# Patient Record
Sex: FEMALE | Race: WHITE | Employment: OTHER | URBAN - METROPOLITAN AREA
[De-identification: names, ages, dates, MRNs, and addresses within clinical notes are randomized per-mention and may not be internally consistent; named-entity substitution may affect disease eponyms.]

---

## 2017-12-15 ENCOUNTER — HOSPITAL ENCOUNTER (OUTPATIENT)
Dept: GENERAL RADIOLOGY | Facility: MEDICAL CENTER | Age: 72
Discharge: HOME | End: 2017-12-15
Attending: ORTHOPAEDIC SURGERY | Admitting: ORTHOPAEDIC SURGERY

## 2017-12-15 ENCOUNTER — OFFICE VISIT (OUTPATIENT)
Dept: ORTHOPEDIC SURGERY | Age: 72
End: 2017-12-15

## 2017-12-15 VITALS
HEART RATE: 82 BPM | DIASTOLIC BLOOD PRESSURE: 82 MMHG | WEIGHT: 200 LBS | HEIGHT: 63 IN | BODY MASS INDEX: 35.44 KG/M2 | SYSTOLIC BLOOD PRESSURE: 138 MMHG

## 2017-12-15 DIAGNOSIS — M25.461 EFFUSION OF RIGHT KNEE: ICD-10-CM

## 2017-12-15 DIAGNOSIS — S83.411A SPRAIN OF MEDIAL COLLATERAL LIGAMENT OF RIGHT KNEE, INITIAL ENCOUNTER: ICD-10-CM

## 2017-12-15 DIAGNOSIS — G89.29 CHRONIC PAIN OF RIGHT KNEE: ICD-10-CM

## 2017-12-15 DIAGNOSIS — M25.561 CHRONIC PAIN OF RIGHT KNEE: Primary | ICD-10-CM

## 2017-12-15 DIAGNOSIS — G89.29 CHRONIC PAIN OF RIGHT KNEE: Primary | ICD-10-CM

## 2017-12-15 DIAGNOSIS — M23.91 INTERNAL DERANGEMENT OF RIGHT KNEE: ICD-10-CM

## 2017-12-15 DIAGNOSIS — M21.061 ACQUIRED GENU VALGUM OF RIGHT KNEE: ICD-10-CM

## 2017-12-15 DIAGNOSIS — M25.561 CHRONIC PAIN OF RIGHT KNEE: ICD-10-CM

## 2017-12-15 DIAGNOSIS — M17.11 PRIMARY OSTEOARTHRITIS OF RIGHT KNEE: ICD-10-CM

## 2017-12-15 PROCEDURE — 73562 X-RAY EXAM OF KNEE 3: CPT | Performed by: PHYSICIAN ASSISTANT

## 2017-12-15 PROCEDURE — 99214 OFFICE O/P EST MOD 30 MIN: CPT | Performed by: PHYSICIAN ASSISTANT

## 2017-12-15 RX ORDER — ESOMEPRAZOLE MAGNESIUM 40 MG/1
40 CAPSULE, DELAYED RELEASE ORAL PRN
COMMUNITY
Start: 2017-04-14

## 2017-12-15 NOTE — LETTER
FAXED/SENT TO Dr Everton Flaherty  12/15/17, 4:16 PM        Leonora Younger PA-C  Orthopaedic Surgery & Sports Medicine      Dear Dr Everton Flaherty,    Thank you very much for your referral of Ms. Lencho Hassan to me for evaluation and treatment. Attached below is my report and recommendations from Jeny More most recent office visit. I appreciate your confidence in me and thank you for allowing me the opportunity to care for your patients. If I can be of any further assistance to you on this or any other patient, please do not hesitate to contact me. Sincerely,    Leonora Younger PA-C  Electronically signed by Leonora Younger PA-C on 12/15/2017 at 4:16 PM     Contact Information:  Otto Cooks,  &  Clinical Staff  [phone], Option [phone] [place]  Orthopaedic Surgery & Sports Medicine       [address] OFFICE    [facility] OFFICE  [address] [place] [address], [address] [place] [address]    [phone] Option 3   [phone] Option [phone] (fax)    [phone] (fax)       PATIENT: Lencho Hassan    81 y.o.  female  [facility]: 1945   MRN:  [id number]       Date of current encounter: 12/15/2017  This encounter is evaluated as a:        New Patient Visit     Established Patient Visit, has seen Dr. Fidencio Toledo       Patient's PCP is Dr. Retana Rather:     Chief Complaint   Patient presents with    Knee Pain     evaluation of right knee        HPI:  Lencho Hassan is a 81y.o. year old,  female complaining of right knee pain. On October 30th she was walking and carrying some sheets in her condo in [place] when she slipped on one of the sheets and fell directly onto her right knee. Her knee did swell and bruise. She rested, elevated and iced her right knee which did help some.  She continues to have pain in 1). The various treatment options were outlined and discussed with Louise Banda including:       Conservative care options:      physical therapy, ice, NSAID's, bracing, and activity modification        The indications for therapeutic injections: Steroids and Hyluronic Acid/Synvisc/Eufflexxa/Supartz       The indications for additional imaging/laboratory studies       The indications for (possible future) operative intervention: Right total knee replacement in her lifetime    2). After considering the various options discussed, Louise Banda elected to pursue a course of treatment that includes the following:       MEDICATIONS/REFILLS prescribed today are listed below. No refills today. She may continue to take Tylenol. She cannot take anti-inflammatories due to a history of ulcers and hiatal hernia. She can use Asper cream with lidocaine over-the-counter ointment for her knee and Lidocare pain patches which are also available over-the-counter. Physical Therapy/HEP Formal physical therapy for her right knee      Script: 2 x per week x 4 weeks     Ice to affected area: 15-20 minutes 4 x day     Over the Counter/Suppliment Tx     Taking Glucosamine and Chondroitin Sulfate (1500 mg/d)     Vit D 3 (at least 2,000 IU/day)     DME's: Continue to wear compression knee brace. I did recommend that she use her walker or cane for stability. Radiology/Misc Testing:      MRIs: right knee     Return to Clinic/Follow - Up:  Louise Banda was asked to make a follow-up appointment:     1-2 weeks following MRI, to review results. Louise Banda was instructed to call the office if her symptoms worsen or if new symptoms appear prior to the next scheduled visit. She is specifically instructed to contact the office between now & her scheduled appointment if she has concerns related to her condition or if she needs assistance in scheduling the above tests.  She is welcome to call for an appointment sooner if she has any additional concerns or questions. Patient Education Materials Provided:   Ryder Jay PA-C: Dr. Juan C Berg patient folder, Knee Booklet, Anatomic Drawings and treatment algorithms    Patient Instructions      I have asked Louisa Mckeon to schedule a follow-up appointment with me after her testing to review the results of these studies in approximately 1 - 2 weeks. She is specifically instructed to contact the office between now & her scheduled appointment if she has concerns related to her condition. She is welcome to call for an appointment sooner if she has any additional concerns or questions. Ice (\"ICE IS YOUR FRIEND\": try using a bag of frozen peas or corn) to injured/painful area for 15  20 minutes 3 x day. General Medication Instructions:  Any prescriptions must be used as directed. If you have any concerns, questions or require refills, please contact our office. If you experience any adverse reactions, stop the medication and call our office immediately. If you designate a preferred pharmacy, appropriate prescriptions will be sent to your preferred pharmacy for pickup to be use as directed. Patient Driving Instructions:  No driving if you are taking narcotic pain medications or muscle relaxers. PATIENT REMINDER:   Carry a list of your medications and allergies with you at all times. Call your pharmacy and our office at least 1 week in advance to refill prescriptions. Narcotic medications will not be refilled after hours or on weekends. ATTENTION    As of October 2, 2014, the [facility] ([address]) has mandated that ALL PRESCRIPTION PAIN MEDICINE cannot be called into your pharmacy.     This includes:    Oxycodone (Percocet)  Hydrocodone (Vicodin, Norco)  Tramadol (Ultram)  Other    These medications must have prescriptions which are written and signed by If you or someone you know struggles with weight issues and joint pain, please check out this important documentary:      \"Start Moving Start Living\" =  [email or web address]       (YOUTUBE video SMSL FULL SD)           FALL PREVENTION:  SIMPLE TIPS    Vitamin D3:  Over-the-counter supplement of Vitamin D3, (at least 2,000 IU daily). Vitamin D3 is widely available without a prescription at pharmacies and buying clubs ([facility]) and on-line at sites such as Amazon.com. It comes in a variety of formulations (tablets, gelcaps, liquid) and doses (1,000 IU, 2,000 IU, 4,000 IU, 5,000 IU and even higher). The right dose for most people is 2,000 IU per day but higher doses are sometimes needed. You can take your vitamin D3 at any time of the day, with or without food, with or without calcium. Because vitamin D is long acting, if you miss your vitamin D on one day you can double up the dose on a later day. Exercise: Low impact exercise programs such as Apolinar Chi. Chair Raise Exercise. Yoga. Adult fitness classes at the  or community [place]. Physical Therapy: Formal physical therapy program to strengthen your lower extremities, improve your balance and gait. Home Assessment: Remove clutter and tripping hazards: loose/throw rugs, cords or cables. Install or placement of railings, grab bars around steps/stairs and in the bathroom (toilet, bath tub, sink). Improve lighting. Foot Wear:  Stable, well fitting. Avoid loose straps or ties, slippery soles. Vision/Eye Check Up: Have your vision checked yearly. Resources:  www.cdc.gov/injury/STEADI    1). STEADI: Stay Independent Self Risk Assessment    2). CDC Handouts: How to prevent falls, Home Safety Fall Prevention         If you or someone you know struggles with weight issues and joint pain, please check out this important documentary:      \"Start Moving Start Living\" =  Http://[employer]

## 2017-12-15 NOTE — PATIENT INSTRUCTIONS
& Clinical Staff  [phone], Option 3         IMPORTANT NARCOTIC INFORMATION: PLEASE READ     [x] DO NOT share your prescription medication with anyone! Sharing is illegal.  The prescription dose is based on your age, weight, and health problems. Sharing your narcotic prescription can lead to accidental death of the individual for which the prescription was not prescribed. You may not know about his/her addiction problem. [x] Always use the same pharmacy when filling your narcotic prescriptions. [x] DO NOT mix your narcotic prescription with alcohol. Mixing the narcotic prescription medication with alcohol causes depressive effects including breathing problems, organ malfunction, and cardiac arrest.     [x] Always keep your narcotic medication in a locked, secured location. Keep your medication private. This is to avoid individuals from taking your medication without your knowledge. This medication is highly sought after and locking your prescriptions will protect you from being a target of crime. [x] DO NOT stock pile your medication. This also will protect you from being a target of crime. [x] Dispose of any unused medications properly. Do not flush the medications. Look for appropriate waste collection programs in your community and drug take back events. [x] DO NOT drive while taking narcotic pain medication or muscle relaxers. FOR MORE INFORMATION, CHECK OUT THIS RESOURCE    For your convenience, Dr. Peggy Fernandes has provided the following link that may be helpful for you if you would like more information on your Orthopaedic condition:    www. Orthoinfo. org         If you or someone you know struggles with weight issues and joint pain, please check out this important documentary:      \"Start Moving Start Living\" =  [email or web address]       (YOUTUBE video SMSL FULL SD)           FALL PREVENTION:  SIMPLE TIPS    Vitamin D3:  Over-the-counter

## 2017-12-15 NOTE — PROGRESS NOTES
children: N/A    Years of education: N/A     Occupational History    Not on file. Social History Main Topics    Smoking status: Never Smoker    Smokeless tobacco: Never Used    Alcohol use Not on file    Drug use: Unknown    Sexual activity: Not on file     Other Topics Concern    Not on file     Social History Narrative    No narrative on file     No family history on file. Review of Systems (ROS):    [x]Performed. Hershal Person review of systems has been performed by intake and observation. An updated ROS was scanned into the media tab of the chart today 12/15/2017 . She has been instructed to contact her primary care physician regarding ROS issues if not already being addressed at this time. There are no recent changes. Objective:   Physical Exam  Vital Signs:  /82   Pulse 82   Ht 5' 3\" (1.6 m)   Wt 200 lb (90.7 kg)   BMI 35.43 kg/m²     Constitution:  Generally, Eveline Byrnes is [x] alert, [x] appears stated age, and [x] in no distress. Her general body habitus is [] Cachectic  [] Thin  [] Normal  [x] Obese  [] Morbidly Obese    Head: [x] Normocephalic  Eyes: [x] Extra-occular muscles intact   Left Ear: [x] External Ear normal   Right Ear: [x] External Ear normal   Nose: [x] Normal  Mouth: [x] Oral mucosa moist  [x] No perioral lesions    Pulmonary: [x] Respirations unlabored and regular  Skin: [x] Warm [x] Well perfused     Psychiatric:   [x] Good judgement and insight  [x] Oriented to [x] person, [x] place, and [x] time  [x] Mood appropriate for circumstances    Gait:   Gait is [] Normal  [x] Impaired limping  Assistive Device: [x] None  [] Knee Brace  [] El Spare  [] Crutches   [] Barbette Augustine   [] Wheelchair  [] Other    ORTHOPAEDIC KNEE EXAM: RIGHT   Inspection:  [x] Skin intact without abrasion or lacerations.   [x] Ecchymosis:  [x] none  [] mild  [] moderate  [] severe  [x] Atrophy:  [x] none  [] mild  [] moderate  [] severe  [x] Effusion: [] none  [x] mild  [] moderate  [] severe  [] Scar / Surgical incision(s): [] A-Scope Portals  [] Open Surgical Incision(s)    Alignment:  [] Normal  [] Varus [x] Valgus    Range of Motion:  [] Normal Knee ROM         [] Deferred: acute injury/post-surgery/pain   [x] Limited ROM: 0-90° with pain at end ranges    Palpation:   [] No Tenderness  [x] Tenderness: Medial joint line, MCL insertion, tibial tubercle [] mild  [x] moderate  [] severe   [x] Patellofemoral Crepitation:  [] none  [] mild  [x] moderate  [] severe  [x] Baker's Cyst:  [x] none  [] small  [] medium  [] large  [x] Robert's Sign: [x] negative  [] positive    Provocative Tests:   [x] Negative: Lachman, posterior drawer, varus stress test, patellar apprehension  Positive Tests:   Meniscus Testing:   [x] Medial Timo's Test (MMT)   [x] Lateral Timo's Test (LMT)        Instability Testing:   [] Lachman (ACL)   [] Posterior Drawer (PCL)   [x] Valgus Stress in Extension (MCL) Positive pain    [x] Valgus Stress in 30º of Flexion (MCL) Positive for pain    [] Varus Stress Test (LCL)       [] Patella Apprehension   [] General Ligament Laxity     Provocative Tests: BILATERAL HIP(S)  [x] Negative logroll  Positive Tests:  [] Log Roll   [x] Hamstrings Tightness     Motor Function:   [x] No gross motor weakness  [] Motor weakness:  [] mild  [] moderate  [] severe     Neurologic:  [x] Sensation to light touch intact   [x] Coordination / proprioception intact    Circulation:  [x] The limb is warm and well perfused  [x] Capillary refill is intact. [x] Edema  [x] none  [] mild  [] moderate  [] severe  [x] Venous stasis changes  [x] none  [] mild  [] moderate  [] severe    Contralateral Knee:  [x] No pain with ROM. Well-healed anterior midline surgical incision consistent with left total knee replacement.     Data Reviewed:     XRays:  (3 views: AP, lateral and patella views) of her right knee taken today 12/15/17 in the office and reviewed by me personally showed moderate degenerative changes in the patellofemoral compartment, moderate to severe degenerative changes in the medial compartment and mild degenerative changes in the lateral compartment. There is joint space narrowing and osteophyte formation in the medial compartment. There is neutral alignment. Of note on AP view there is a satisfactory left TKR with no evidence of acute fracture or loosening. [x]  A copy of the AP XRay was given to her and reviewed with her today. Assessment (Medical Decision Making):     Sarah Oden is a 81y.o. year old female with the following diagnosis and treatments ordered today:    1. Chronic pain of right knee  XR KNEE RIGHT (3 VIEWS)    MRI Knee Right WO Contrast    Ambulatory referral to Physical Therapy   2. Primary osteoarthritis of right knee  MRI Knee Right WO Contrast    Ambulatory referral to Physical Therapy   3. Acquired genu valgum of right knee  MRI Knee Right WO Contrast    Ambulatory referral to Physical Therapy   4. Effusion of right knee  MRI Knee Right WO Contrast    Ambulatory referral to Physical Therapy   5. Sprain of medial collateral ligament of right knee, initial encounter     6. Internal derangement of right knee  MRI Knee Right WO Contrast    Ambulatory referral to Physical Therapy       Her overall course: Worsening    Plan (Medical Decision Making):      I discussed the diagnosis and the treatment options with Sarah Oden today. In Summary:  1). The various treatment options were outlined and discussed with Saarh Oden including:      [x] Conservative care options:      physical therapy, ice, NSAID's, bracing, and activity modification       [x] The indications for therapeutic injections: Steroids and Hyluronic Acid/Synvisc/Eufflexxa/Supartz      [x] The indications for additional imaging/laboratory studies      [x] The indications for (possible future) operative intervention: Right total knee replacement in her lifetime    2).  After considering the various options Requested:   1       Refills/New Prescriptions:  No orders of the defined types were placed in this encounter. Today's prescription medications will be e-scribed (when appropriate) to the Patient's Preferred Pharmacy:   [facility] [address], [address] [phone] [facility] [phone]  [facility] [address]  Phone: [phone] Fax: [phone]       Shahnaz Wilcox PA-C  Electronically signed by Shahnaz Wilcox PA-C on 12/15/2017 at 4:11 PM     Contact Information:  Tyler Boyer, [address] Staff  [phone], Option 3    This dictation was performed with a verbal recognition program (DRAGON) and it was checked for errors. It is possible that there are still dictated errors within this office note. If so, please bring any errors to my attention for an addendum. All efforts were made to ensure that this office note is accurate. I have personally performed and/or participated in the history, physical exam and medical decision making and reviewed all pertinent clinical information unless otherwise noted.

## 2017-12-22 ENCOUNTER — OFFICE VISIT (OUTPATIENT)
Dept: ORTHOPEDIC SURGERY | Age: 72
End: 2017-12-22

## 2017-12-22 VITALS
BODY MASS INDEX: 35.44 KG/M2 | SYSTOLIC BLOOD PRESSURE: 130 MMHG | WEIGHT: 200 LBS | HEIGHT: 63 IN | HEART RATE: 75 BPM | DIASTOLIC BLOOD PRESSURE: 85 MMHG

## 2017-12-22 DIAGNOSIS — M25.461 EFFUSION OF RIGHT KNEE: ICD-10-CM

## 2017-12-22 DIAGNOSIS — D16.21 ENCHONDROMA OF RIGHT TIBIA: ICD-10-CM

## 2017-12-22 DIAGNOSIS — G89.29 CHRONIC PAIN OF RIGHT KNEE: Primary | ICD-10-CM

## 2017-12-22 DIAGNOSIS — S83.411A SPRAIN OF MEDIAL COLLATERAL LIGAMENT OF RIGHT KNEE, INITIAL ENCOUNTER: ICD-10-CM

## 2017-12-22 DIAGNOSIS — M21.061 ACQUIRED GENU VALGUM OF RIGHT KNEE: ICD-10-CM

## 2017-12-22 DIAGNOSIS — M17.11 PRIMARY OSTEOARTHRITIS OF RIGHT KNEE: ICD-10-CM

## 2017-12-22 DIAGNOSIS — M25.561 CHRONIC PAIN OF RIGHT KNEE: Primary | ICD-10-CM

## 2017-12-22 DIAGNOSIS — S83.231D COMPLEX TEAR OF MEDIAL MENISCUS OF RIGHT KNEE AS CURRENT INJURY, SUBSEQUENT ENCOUNTER: ICD-10-CM

## 2017-12-22 DIAGNOSIS — M71.21 BAKER'S CYST OF KNEE, RIGHT: ICD-10-CM

## 2017-12-22 PROBLEM — M23.91 INTERNAL DERANGEMENT OF RIGHT KNEE: Status: ACTIVE | Noted: 2017-12-22

## 2017-12-22 PROCEDURE — 20610 DRAIN/INJ JOINT/BURSA W/O US: CPT | Performed by: PHYSICIAN ASSISTANT

## 2017-12-22 PROCEDURE — 99999 PR OFFICE/OUTPT VISIT,PROCEDURE ONLY: CPT | Performed by: PHYSICIAN ASSISTANT

## 2017-12-22 NOTE — PROGRESS NOTES
Yes  Work-Related Injury: No  Are there other pain locations you wish to document?: No    Patient Active Problem List   Diagnosis    Knee effusion    Primary osteoarthritis of right knee    Acquired valgus deformity knee    Status post total left knee replacement    Unstable right knee    Chronic pain of right knee    Sprain of medial collateral ligament of right knee    Internal derangement of right knee     Past Medical History:   Diagnosis Date    Arthritis     Asthma     Fibromyalgia     Hyperlipidemia     Obesity     Vertigo      Past Surgical History:   Procedure Laterality Date    BREAST BIOPSY      HYSTERECTOMY      JOINT REPLACEMENT Left     by Dr. Gretchen Best       Allergies:  Sulfa antibiotics    Medications:  Prior to Visit Medications    Medication Sig Taking? Authorizing Provider   esomeprazole (NEXIUM) 40 MG delayed release capsule Take 40 mg by mouth  Historical Provider, MD   meclizine (ANTIVERT) 12.5 MG tablet Take 12.5 mg by mouth 3 times daily as needed. Historical Provider, MD   sertraline (ZOLOFT) 50 MG tablet Take 50 mg by mouth daily. Historical Provider, MD   loratadine (CLARITIN) 10 MG tablet Take 10 mg by mouth daily. Historical Provider, MD   fluticasone (FLOVENT HFA) 110 MCG/ACT inhaler Inhale 1 puff into the lungs 2 times daily. Historical Provider, MD     Social History     Social History    Marital status:      Spouse name: N/A    Number of children: N/A    Years of education: N/A     Occupational History    Not on file. Social History Main Topics    Smoking status: Never Smoker    Smokeless tobacco: Never Used    Alcohol use 0.0 oz/week    Drug use: No    Sexual activity: Not on file     Other Topics Concern    Not on file     Social History Narrative    No narrative on file     History reviewed. No pertinent family history. Review of Systems (ROS):    [x]Performed.   Joan Mccabe review of systems has been performed by intake and weakness:  [] mild  [] moderate  [] severe      Neurologic:  [x] Sensation to light touch intact   [x] Coordination / proprioception intact    Circulation:  [x] The limb is warm and well perfused  [x] Capillary refill is intact. [x] Edema  [x] none  [] mild  [] moderate  [] severe  [x] Venous stasis changes  [x] none  [] mild  [] moderate  [] severe    Contralateral Knee:  [x] No pain with ROM. Well-healed anterior midline surgical incision consistent with left total knee replacement. Bilateral Hip Exam:  [x] Negative logroll    Data Reviewed:     No imaging studies were obtained today. XRays:  (3 views: AP, lateral and patella views) of her right knee taken 12/15/17  showed moderate degenerative changes in the patellofemoral compartment, moderate to severe degenerative changes in the medial compartment and mild degenerative changes in the lateral compartment. There is joint space narrowing and osteophyte formation in the medial compartment. There is neutral alignment. Of note on AP view there is a satisfactory left TKR with no evidence of acute fracture or loosening. MRI Results: right knee dated 12/20/17 shows:  CONCLUSION:    1. 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 This is    similar to prior study although may have slightly progressed. Regions of intermediate-grade to    high-grade diffuse chondromalacia are noted throughout the medial, patellofemoral and less so    lateral compartment. 2. Diffuse medial capsulitis or capsulosynovitis. This progressed relative to the prior study. 3. No fracture, AVN or mass. 4. Please see above. Assessment (Medical Decision Making):     Kevin Bunch is a 81y.o. year old female with the following diagnosis:    1.  Chronic pain of right knee  DE ARTHROCENTESIS ASPIR&/INJ MAJOR JT/BURSA W/O US    DE METHYLPREDNISOLONE shoes are suggested    [x] Injection into her right knee today. Return to Clinic/Follow - Up:  Coty Shahid was asked to make a follow-up appointment:    [x] With Dr. Jr Camp when she returns from [place] in 4 months. Coty Shahid was instructed to call the office if her symptoms worsen or if new symptoms appear prior to the next scheduled visit. She is specifically instructed to contact the office between now & her scheduled appointment if she has concerns related to her condition or if she needs assistance in scheduling the above tests. She is welcome to call for an appointment sooner if she has any additional concerns or questions. PROCEDURE NOTE: RIGHT KNEE INJECTION  12/22/2017 at 12:59 PM   Procedure: Injection  Verbal consent was obtained. Risks and benefits were explained. Questions were encouraged and answered. Timeout Verification Completed including:    Correct patient: Coty Shahid was identified    Correct procedure    Correct site & side    Correct equipment and supplies    Staff member: Karla Bruner PA-C      Assistant: John Koehler     Injection Site: Superolateral    The injection site was prepped with Chlora-Prep using aseptic technique and a right knee intra-articular injection was performed with ethyl chloride for anesthetic. Depomedrol 2 ml (40 mg/ml = 80 mg total) was injected. A sterile adhesive dressing was applied. Post procedure:  Coty Shahid tolerated the treatment well. Instructions to patient:  Appropriate post injections instructions were given to Coty Shahid. Patient Education Materials Provided:  [x] A copy of the test results (MRI of her right knee) which we reviewed together today. Anatomic drawings and treatment algorithms     Patient Instructions      I have asked Coty Shahid to schedule an appointment with Dr. Jr Camp.     Ice (\"ICE IS YOUR FRIEND\": try using a bag of frozen peas or corn) to injured/painful area for 15  20 minutes 3 x day. General Medication Instructions:  Any prescriptions must be used as directed. If you have any concerns, questions or require refills, please contact our office. If you experience any adverse reactions, stop the medication and call our office immediately. If you designate a preferred pharmacy, appropriate prescriptions will be sent to your preferred pharmacy for pickup to be use as directed. Patient Driving Instructions:  No driving if you are taking narcotic pain medications or muscle relaxers. PATIENT REMINDER:   Carry a list of your medications and allergies with you at all times. Call your pharmacy and our office at least 1 week in advance to refill prescriptions. Narcotic medications will not be refilled after hours or on weekends. FALL PREVENTION:  SIMPLE TIPS    Vitamin D3:  Over-the-counter supplement of Vitamin D3, (at least 2,000 IU daily). Vitamin D3 is widely available without a prescription at pharmacies and buying clubs ([facility]) and on-line at sites such as Amazon.com. It comes in a variety of formulations (tablets, gelcaps, liquid) and doses (1,000 IU, 2,000 IU, 4,000 IU, 5,000 IU and even higher). The right dose for most people is 2,000 IU per day but higher doses are sometimes needed. You can take your vitamin D3 at any time of the day, with or without food, with or without calcium. Because vitamin D is long acting, if you miss your vitamin D on one day you can double up the dose on a later day. Exercise: Low impact exercise programs such as Apolinar Chi. Chair Raise Exercise. Yoga. Adult fitness classes at the  or community center. Physical Therapy: Formal physical therapy program to strengthen your lower extremities, improve your balance and gait. Home Assessment: Remove clutter and tripping hazards: loose/throw rugs, cords or cables. Install or placement of railings, grab bars around steps/stairs and in the bathroom (toilet, bath tub, sink).  Improve

## 2017-12-22 NOTE — PATIENT INSTRUCTIONS
I have asked Dinesh Albert to schedule an appointment with Dr. Phong Castillo. Ice (\"ICE IS YOUR FRIEND\": try using a bag of frozen peas or corn) to injured/painful area for 15  20 minutes 3 x day. General Medication Instructions:  Any prescriptions must be used as directed. If you have any concerns, questions or require refills, please contact our office. If you experience any adverse reactions, stop the medication and call our office immediately. If you designate a preferred pharmacy, appropriate prescriptions will be sent to your preferred pharmacy for pickup to be use as directed. Patient Driving Instructions:  No driving if you are taking narcotic pain medications or muscle relaxers. PATIENT REMINDER:   Carry a list of your medications and allergies with you at all times. Call your pharmacy and our office at least 1 week in advance to refill prescriptions. Narcotic medications will not be refilled after hours or on weekends. FALL PREVENTION:  SIMPLE TIPS    Vitamin D3:  Over-the-counter supplement of Vitamin D3, (at least 2,000 IU daily). Vitamin D3 is widely available without a prescription at pharmacies and buying clubs ([facility]) and on-line at sites such as Amazon.com. It comes in a variety of formulations (tablets, gelcaps, liquid) and doses (1,000 IU, 2,000 IU, 4,000 IU, 5,000 IU and even higher). The right dose for most people is 2,000 IU per day but higher doses are sometimes needed. You can take your vitamin D3 at any time of the day, with or without food, with or without calcium. Because vitamin D is long acting, if you miss your vitamin D on one day you can double up the dose on a later day. Exercise: Low impact exercise programs such as Apolinar Chi. Chair Raise Exercise. Yoga. Adult fitness classes at the  or community [place]. Physical Therapy: Formal physical therapy program to strengthen your lower extremities, improve your balance and gait.     Home Assessment: Remove clutter and tripping hazards: loose/throw rugs, cords or cables. Install or placement of railings, grab bars around steps/stairs and in the bathroom (toilet, bath tub, sink). Improve lighting. Foot Wear:  Stable, well fitting. Avoid loose straps or ties, slippery soles. Vision/Eye Check Up: Have your vision checked yearly. Resources:  www.cdc.gov/injury/STEADI    1). STEADI: Stay Independent Self Risk Assessment    2). CDC Handouts:  How to prevent falls, Home Safety Fall Prevention         If you or someone you know struggles with weight issues and joint pain, please check out this important documentary:      \"Start Moving Start Living\" =  Http://startmovingBackspaces.[employer]       ([employer] video SMSL FULL SD)

## 2017-12-22 NOTE — LETTER
describes her pain as aching, grinding, popping, cracking and buckling. It is persistent, intermittent and constant. Bending, kneeling, squatting, standing, walking and stairs aggravate her pain. She does feel her knee limits her behavior. Rest, ice and Tylenol help to relieve her pain. She does have night pain. She does have morning stiffness. She is leaving for [place] for 4 months next week. PAIN ASSESSMENT:   Pain Assessment  Location of Pain: Knee  Location Modifiers: Right  Severity of Pain: 6  Quality of Pain: Aching, Grinding, Cracking, Buckling  Duration of Pain: Persistent  Frequency of Pain: Intermittent  Date Pain First Started: 10/30/17  Aggravating Factors: Bending, Kneeling, Squatting, Standing, Walking, Stairs  Limiting Behavior: Yes  Relieving Factors: Rest, Ice, Other (Comment) (tylenol)  Result of Injury: Yes  Work-Related Injury: No  Are there other pain locations you wish to document?: No    Patient Active Problem List   Diagnosis    Knee effusion    Primary osteoarthritis of right knee    Acquired valgus deformity knee    Status post total left knee replacement    Unstable right knee    Chronic pain of right knee    Sprain of medial collateral ligament of right knee    Internal derangement of right knee     Past Medical History:   Diagnosis Date    Arthritis     Asthma     Fibromyalgia     Hyperlipidemia     Obesity     Vertigo      Past Surgical History:   Procedure Laterality Date    BREAST BIOPSY      HYSTERECTOMY      JOINT REPLACEMENT Left     by Dr. Katie No       Allergies:  Sulfa antibiotics    Medications:  Prior to Visit Medications    Medication Sig Taking? Authorizing Provider   esomeprazole (NEXIUM) 40 MG delayed release capsule Take 40 mg by mouth  Historical Provider, MD   meclizine (ANTIVERT) 12.5 MG tablet Take 12.5 mg by mouth 3 times daily as needed. Historical Provider, MD   sertraline (ZOLOFT) 50 MG tablet Take 50 mg by mouth daily.     Historical Provider, MD   loratadine (CLARITIN) 10 MG tablet Take 10 mg by mouth daily. Historical Provider, MD   fluticasone (FLOVENT HFA) 110 MCG/ACT inhaler Inhale 1 puff into the lungs 2 times daily. Historical Provider, MD     Social History     Social History    Marital status:      Spouse name: N/A    Number of children: N/A    Years of education: N/A     Occupational History    Not on file. Social History Main Topics    Smoking status: Never Smoker    Smokeless tobacco: Never Used    Alcohol use 0.0 oz/week    Drug use: No    Sexual activity: Not on file     Other Topics Concern    Not on file     Social History Narrative    No narrative on file     History reviewed. No pertinent family history. Review of Systems (ROS):    Performed. Johana Heard review of systems has been performed by intake and observation. The most recent ROS was scanned into the media tab of the chart on 12/15/2017. She has been instructed to contact her primary care physician regarding ROS issues if not already being addressed at this time. There are no recent changes. Objective:   Physical Exam  Vital Signs:  /85   Pulse 75   Ht 5' 3\" (1.6 m)   Wt 200 lb (90.7 kg)   BMI 35.43 kg/m²      Constitution:  Generally, Jacklyn Hernandez is  alert,  appears stated age, and  in no distress.   Her general body habitus is  Cachectic   Thin   Normal   Obese   Morbidly Obese    Head:  Normocephalic  Eyes:  Extra-occular muscles intact    Left Ear:  External Ear normal   Right Ear:  External Ear normal   Nose:  Normal  Mouth:  Oral mucosa moist   No perioral lesions    Pulmonary:  Respirations unlabored and regular  Skin:  Warm  Well perfused     Psychiatric:    Good judgement and insight   Oriented to  person,  place, and  time   Mood appropriate for circumstances    Gait:   Gait is  Normal   Impaired slight limp  Assistive Device:  None   Knee Brace   Cane   Crutches    Walker    Wheelchair   Other and less so free edge of the body and less so anterior horn of the lateral meniscus. This is    similar to prior study although may have slightly progressed. Regions of intermediate-grade to    high-grade diffuse chondromalacia are noted throughout the medial, patellofemoral and less so    lateral compartment. 2. Diffuse medial capsulitis or capsulosynovitis. This progressed relative to the prior study. 3. No fracture, AVN or mass. 4. Please see above. Assessment (Medical Decision Making):     Jori Tavera is a 81y.o. year old female with the following diagnosis:    1. Chronic pain of right knee  AL ARTHROCENTESIS ASPIR&/INJ MAJOR JT/BURSA W/O US    AL METHYLPREDNISOLONE 40 MG INJ   2. Primary osteoarthritis of right knee  AL ARTHROCENTESIS ASPIR&/INJ MAJOR JT/BURSA W/O US    AL METHYLPREDNISOLONE 40 MG INJ   3. Acquired genu valgum of right knee     4. Complex tear of medial meniscus of right knee as current injury, subsequent encounter     5. Effusion of right knee     6. Baker's cyst of knee, right     7. Medial capsulitis of right knee     8. Enchondroma of right tibia         Her overall course: Unchanged    Plan (Medical Decision Making):      I discussed the diagnosis and the treatment options with Jori Tavera today. In Summary:  1). The various treatment options were outlined and discussed with Jori Tavera including:       Conservative care options:      physical therapy, ice, NSAIDs, bracing, and activity modification        The indications for therapeutic injections Steroids and Hyluronic Acid/Synvisc/Euflexxa/Supartz       The indications for (possible future) operative intervention Total knee replacement in her lifetime. She would like to continue with conservative treatment at this time. 2).  After considering the various options discussed, Jori Tavera elected to pursue a course of treatment that includes the following: MEDICATIONS/REFILLS prescribed today are listed below. No refills today. She cannot take anti-inflammatories due to her history of ulcers and hiatal hernia. She can use Asper cream with lidocaine over-the-counter ointment for her knee and Lidocare pain patches which are also available over-the-counter. Physical Therapy/HEP Formal physical therapy for her right knee as recommended at her prior visit. Script: 2 x per week x 4 weeks     Ice to affected area: 15-20 minutes 4 x day     Over the Counter/Suppliment Tx     Drinking 6-8 oz of Tart Cherry Juice     Taking Glucosamine and Chondroitin Sulfate (1500 mg/d)     Vit D 3 (at least 2,000 IU/day)     DME's: Knee brace is optional, comfortable shoes are suggested     Injection into her right knee today. Return to Clinic/Follow - Up:  Celsa Doherty was asked to make a follow-up appointment:     With Dr. Caroline Mosher when she returns from [place] in 4 months. Celsa Doherty was instructed to call the office if her symptoms worsen or if new symptoms appear prior to the next scheduled visit. She is specifically instructed to contact the office between now & her scheduled appointment if she has concerns related to her condition or if she needs assistance in scheduling the above tests. She is welcome to call for an appointment sooner if she has any additional concerns or questions. PROCEDURE NOTE: RIGHT KNEE INJECTION  12/22/2017 at 12:59 PM   Procedure: Injection  Verbal consent was obtained. Risks and benefits were explained. Questions were encouraged and answered.       Timeout Verification Completed including:    Correct patient: Celsa Doherty was identified    Correct procedure    Correct site & side    Correct equipment and supplies    Staff member: Benjie Gowers PA-C      Assistant: Lonny Koehler     Injection Site: Superolateral    The injection site was prepped with Chlora-Prep using aseptic technique and a right knee intra-articular injection was performed with ethyl chloride for anesthetic. Depomedrol 2 ml (40 mg/ml = 80 mg total) was injected. A sterile adhesive dressing was applied. Post procedure:  Brown Alivia tolerated the treatment well. Instructions to patient:  Appropriate post injections instructions were given to Kevin Bunch. Patient Education Materials Provided:   A copy of the test results (MRI of her right knee) which we reviewed together today. Anatomic drawings and treatment algorithms     Patient Instructions      I have asked Kevin Bunch to schedule an appointment with Dr. Katlyn Wren (\"ICE IS YOUR FRIEND\": try using a bag of frozen peas or corn) to injured/painful area for 15  20 minutes 3 x day. General Medication Instructions:  Any prescriptions must be used as directed. If you have any concerns, questions or require refills, please contact our office. If you experience any adverse reactions, stop the medication and call our office immediately. If you designate a preferred pharmacy, appropriate prescriptions will be sent to your preferred pharmacy for pickup to be use as directed. Patient Driving Instructions:  No driving if you are taking narcotic pain medications or muscle relaxers. PATIENT REMINDER:   Carry a list of your medications and allergies with you at all times. Call your pharmacy and our office at least 1 week in advance to refill prescriptions. Narcotic medications will not be refilled after hours or on weekends. FALL PREVENTION:  SIMPLE TIPS    Vitamin D3:  Over-the-counter supplement of Vitamin D3, (at least 2,000 IU daily). Vitamin D3 is widely available without a prescription at pharmacies and buying clubs ([facility], [facility]) and on-line at sites such as Amazon.com.  It comes in a variety of formulations (tablets, gelcaps, liquid) and doses (1,000 IU, 2,000 IU, 4,000 IU, 5,000 IU and even higher). The right dose for most people is 2,000 IU per day but higher doses are sometimes needed. You can take your vitamin D3 at any time of the day, with or without food, with or without calcium. Because vitamin D is long acting, if you miss your vitamin D on one day you can double up the dose on a later day. Exercise: Low impact exercise programs such as Apolinar Chi. Chair Raise Exercise. Yoga. Adult fitness classes at the  or community center. Physical Therapy: Formal physical therapy program to strengthen your lower extremities, improve your balance and gait. Home Assessment: Remove clutter and tripping hazards: loose/throw rugs, cords or cables. Install or placement of railings, grab bars around steps/stairs and in the bathroom (toilet, bath tub, sink). Improve lighting. Foot Wear:  Stable, well fitting. Avoid loose straps or ties, slippery soles. Vision/Eye Check Up: Have your vision checked yearly. Resources:  www.cdc.gov/injury/STEADI    1). STEADI: Stay Independent Self Risk Assessment    2). CDC Handouts: How to prevent falls, Home Safety Fall Prevention         If you or someone you know struggles with weight issues and joint pain, please check out this important documentary:      \"Start Moving Start Living\" =  Http://startmoving[employer]ion Holdings.[employer]       (YOUTUBE video SMSL FULL SD)            No orders of the defined types were placed in this encounter. Refills/New Prescriptions:  No orders of the defined types were placed in this encounter.     Today's prescription medications will be e-scribed (when appropriate) to the Patient's Preferred Pharmacy:   [facility] [address], [address] [phone] Julisa Merritt [phone]  [facility] [address]  Phone: [phone] Fax: [phone]       Hamilton Rouse PA-C  Electronically signed by Hamilton Rouse PA-C on 12/22/2017 at 12:59 PM     Contact Information:

## 2021-05-03 ENCOUNTER — APPOINTMENT (OUTPATIENT)
Dept: CT IMAGING | Age: 76
End: 2021-05-03
Payer: MEDICARE

## 2021-05-03 ENCOUNTER — HOSPITAL ENCOUNTER (EMERGENCY)
Age: 76
Discharge: HOME | End: 2021-05-03
Attending: EMERGENCY MEDICINE
Payer: MEDICARE

## 2021-05-03 VITALS
BODY MASS INDEX: 35.88 KG/M2 | RESPIRATION RATE: 16 BRPM | SYSTOLIC BLOOD PRESSURE: 109 MMHG | TEMPERATURE: 98.7 F | HEIGHT: 62 IN | HEART RATE: 78 BPM | WEIGHT: 195 LBS | DIASTOLIC BLOOD PRESSURE: 95 MMHG | OXYGEN SATURATION: 100 %

## 2021-05-03 DIAGNOSIS — K92.2 UGI BLEED: Primary | ICD-10-CM

## 2021-05-03 LAB
ABO/RH: NORMAL
ALBUMIN SERPL-MCNC: 4.2 G/DL (ref 3.4–5)
ALP BLD-CCNC: 75 U/L (ref 40–129)
ALT SERPL-CCNC: 11 U/L (ref 10–40)
ANION GAP SERPL CALCULATED.3IONS-SCNC: 11 MMOL/L (ref 3–16)
ANTIBODY IDENTIFICATION: NORMAL
ANTIBODY SCREEN: NORMAL
APTT: 25.3 SEC (ref 24.2–36.2)
AST SERPL-CCNC: 16 U/L (ref 15–37)
BASOPHILS ABSOLUTE: 0.1 K/UL (ref 0–0.2)
BASOPHILS RELATIVE PERCENT: 1.5 %
BILIRUB SERPL-MCNC: 0.3 MG/DL (ref 0–1)
BILIRUBIN DIRECT: <0.2 MG/DL (ref 0–0.3)
BILIRUBIN URINE: NEGATIVE
BILIRUBIN, INDIRECT: NORMAL MG/DL (ref 0–1)
BLOOD, URINE: ABNORMAL
BUN BLDV-MCNC: 26 MG/DL (ref 7–20)
CALCIUM SERPL-MCNC: 9.2 MG/DL (ref 8.3–10.6)
CHLORIDE BLD-SCNC: 101 MMOL/L (ref 99–110)
CHP ED QC CHECK: YES
CLARITY: CLEAR
CO2: 25 MMOL/L (ref 21–32)
COLOR: YELLOW
CREAT SERPL-MCNC: <0.5 MG/DL (ref 0.6–1.2)
DAT IGG CAPTURE: NORMAL
EKG ATRIAL RATE: 73 BPM
EKG DIAGNOSIS: NORMAL
EKG P AXIS: 17 DEGREES
EKG P-R INTERVAL: 188 MS
EKG Q-T INTERVAL: 366 MS
EKG QRS DURATION: 74 MS
EKG QTC CALCULATION (BAZETT): 403 MS
EKG R AXIS: 2 DEGREES
EKG T AXIS: 19 DEGREES
EKG VENTRICULAR RATE: 73 BPM
EOSINOPHILS ABSOLUTE: 0.1 K/UL (ref 0–0.6)
EOSINOPHILS RELATIVE PERCENT: 1.8 %
EPITHELIAL CELLS, UA: ABNORMAL /HPF (ref 0–5)
GFR AFRICAN AMERICAN: >60
GFR NON-AFRICAN AMERICAN: >60
GLUCOSE BLD-MCNC: 98 MG/DL (ref 70–99)
GLUCOSE URINE: NEGATIVE MG/DL
HCT VFR BLD CALC: 39.9 % (ref 36–48)
HEMOCCULT STL QL: POSITIVE
HEMOGLOBIN: 13 G/DL (ref 12–16)
INR BLD: 1.05 (ref 0.86–1.14)
KETONES, URINE: NEGATIVE MG/DL
LEUKOCYTE ESTERASE, URINE: ABNORMAL
LIPASE: 17 U/L (ref 13–60)
LYMPHOCYTES ABSOLUTE: 1.5 K/UL (ref 1–5.1)
LYMPHOCYTES RELATIVE PERCENT: 27.9 %
MCH RBC QN AUTO: 31.8 PG (ref 26–34)
MCHC RBC AUTO-ENTMCNC: 32.5 G/DL (ref 31–36)
MCV RBC AUTO: 97.7 FL (ref 80–100)
MICROSCOPIC EXAMINATION: YES
MONOCYTES ABSOLUTE: 0.4 K/UL (ref 0–1.3)
MONOCYTES RELATIVE PERCENT: 7.8 %
NEUTROPHILS ABSOLUTE: 3.2 K/UL (ref 1.7–7.7)
NEUTROPHILS RELATIVE PERCENT: 61 %
NITRITE, URINE: NEGATIVE
PDW BLD-RTO: 14 % (ref 12.4–15.4)
PH UA: 6 (ref 5–8)
PLATELET # BLD: 304 K/UL (ref 135–450)
PMV BLD AUTO: 7.8 FL (ref 5–10.5)
POC OCCULT BLOOD STOOL: POSITIVE
POTASSIUM REFLEX MAGNESIUM: 4.2 MMOL/L (ref 3.5–5.1)
PROTEIN UA: NEGATIVE MG/DL
PROTHROMBIN TIME: 12.2 SEC (ref 10–13.2)
RBC # BLD: 4.08 M/UL (ref 4–5.2)
RBC UA: ABNORMAL /HPF (ref 0–4)
SODIUM BLD-SCNC: 137 MMOL/L (ref 136–145)
SPECIFIC GRAVITY UA: 1.02 (ref 1–1.03)
TOTAL PROTEIN: 6.8 G/DL (ref 6.4–8.2)
TROPONIN: <0.01 NG/ML
URINE TYPE: ABNORMAL
UROBILINOGEN, URINE: 0.2 E.U./DL
WBC # BLD: 5.3 K/UL (ref 4–11)
WBC UA: ABNORMAL /HPF (ref 0–5)

## 2021-05-03 PROCEDURE — 80048 BASIC METABOLIC PNL TOTAL CA: CPT

## 2021-05-03 PROCEDURE — 99283 EMERGENCY DEPT VISIT LOW MDM: CPT

## 2021-05-03 PROCEDURE — 85025 COMPLETE CBC W/AUTO DIFF WBC: CPT

## 2021-05-03 PROCEDURE — 6360000002 HC RX W HCPCS: Performed by: PHYSICIAN ASSISTANT

## 2021-05-03 PROCEDURE — 82272 OCCULT BLD FECES 1-3 TESTS: CPT

## 2021-05-03 PROCEDURE — 86880 COOMBS TEST DIRECT: CPT

## 2021-05-03 PROCEDURE — 86900 BLOOD TYPING SEROLOGIC ABO: CPT

## 2021-05-03 PROCEDURE — 84484 ASSAY OF TROPONIN QUANT: CPT

## 2021-05-03 PROCEDURE — 85730 THROMBOPLASTIN TIME PARTIAL: CPT

## 2021-05-03 PROCEDURE — 86850 RBC ANTIBODY SCREEN: CPT

## 2021-05-03 PROCEDURE — 80076 HEPATIC FUNCTION PANEL: CPT

## 2021-05-03 PROCEDURE — 6360000004 HC RX CONTRAST MEDICATION: Performed by: PHYSICIAN ASSISTANT

## 2021-05-03 PROCEDURE — 85610 PROTHROMBIN TIME: CPT

## 2021-05-03 PROCEDURE — 2580000003 HC RX 258: Performed by: PHYSICIAN ASSISTANT

## 2021-05-03 PROCEDURE — 86901 BLOOD TYPING SEROLOGIC RH(D): CPT

## 2021-05-03 PROCEDURE — C9113 INJ PANTOPRAZOLE SODIUM, VIA: HCPCS | Performed by: PHYSICIAN ASSISTANT

## 2021-05-03 PROCEDURE — 74177 CT ABD & PELVIS W/CONTRAST: CPT

## 2021-05-03 PROCEDURE — 96374 THER/PROPH/DIAG INJ IV PUSH: CPT

## 2021-05-03 PROCEDURE — 93005 ELECTROCARDIOGRAM TRACING: CPT | Performed by: PHYSICIAN ASSISTANT

## 2021-05-03 PROCEDURE — 83690 ASSAY OF LIPASE: CPT

## 2021-05-03 PROCEDURE — 81001 URINALYSIS AUTO W/SCOPE: CPT

## 2021-05-03 PROCEDURE — 86870 RBC ANTIBODY IDENTIFICATION: CPT

## 2021-05-03 RX ORDER — SODIUM CHLORIDE, SODIUM LACTATE, POTASSIUM CHLORIDE, CALCIUM CHLORIDE 600; 310; 30; 20 MG/100ML; MG/100ML; MG/100ML; MG/100ML
1000 INJECTION, SOLUTION INTRAVENOUS ONCE
Status: COMPLETED | OUTPATIENT
Start: 2021-05-03 | End: 2021-05-03

## 2021-05-03 RX ORDER — RIVAROXABAN 10 MG/1
TABLET, FILM COATED ORAL
COMMUNITY
Start: 2021-04-14

## 2021-05-03 RX ORDER — PANTOPRAZOLE SODIUM 40 MG/10ML
40 INJECTION, POWDER, LYOPHILIZED, FOR SOLUTION INTRAVENOUS ONCE
Status: COMPLETED | OUTPATIENT
Start: 2021-05-03 | End: 2021-05-03

## 2021-05-03 RX ADMIN — SODIUM CHLORIDE, POTASSIUM CHLORIDE, SODIUM LACTATE AND CALCIUM CHLORIDE 1000 ML: 600; 310; 30; 20 INJECTION, SOLUTION INTRAVENOUS at 14:05

## 2021-05-03 RX ADMIN — PANTOPRAZOLE SODIUM 40 MG: 40 INJECTION, POWDER, FOR SOLUTION INTRAVENOUS at 15:05

## 2021-05-03 RX ADMIN — IOPAMIDOL 80 ML: 755 INJECTION, SOLUTION INTRAVENOUS at 13:38

## 2021-05-03 ASSESSMENT — ENCOUNTER SYMPTOMS
CONSTIPATION: 1
TROUBLE SWALLOWING: 0
RECTAL PAIN: 0
VOMITING: 0
ABDOMINAL PAIN: 1
ABDOMINAL DISTENTION: 0
DIARRHEA: 1
FACIAL SWELLING: 0
SORE THROAT: 0
WHEEZING: 0
BACK PAIN: 0
NAUSEA: 0
COUGH: 0
BLOOD IN STOOL: 1
CHEST TIGHTNESS: 0
SHORTNESS OF BREATH: 0
RHINORRHEA: 0

## 2021-05-03 ASSESSMENT — PAIN DESCRIPTION - DESCRIPTORS: DESCRIPTORS: ACHING

## 2021-05-03 ASSESSMENT — PAIN DESCRIPTION - ORIENTATION: ORIENTATION: RIGHT

## 2021-05-03 ASSESSMENT — PAIN DESCRIPTION - LOCATION: LOCATION: ABDOMEN

## 2021-05-03 NOTE — ED PROVIDER NOTES
ED Attending Attestation Note     Date of evaluation: 5/3/2021    This patient was seen by the advance practice provider. I have seen and examined the patient, agree with the workup, evaluation, management and diagnosis. The care plan has been discussed. I have reviewed the ECG and concur with the BRUCE's interpretation. My assessment reveals benign abdomen.      Mdaison Kaplan MD  05/03/21 [id number]

## 2021-05-03 NOTE — ED PROVIDER NOTES
[address] ENCOUNTER          PHYSICIAN ASSISTANT NOTE       Date of evaluation: 5/3/2021    Chief Complaint     Abdominal Pain (for a couple days, -n/v, +diarrhea) and Melena (hx DVT and PE, took some xerelto since she was taking a flight, started with dark stool 24 hours ago)      History of Present Illness     Enedina Olson is a 78 y.o. female who presents complaining of dark tarry stools over the last 24 hours. Patient states she started having loose stools with diarrhea yesterday and noticed her stool was black. Patient does see Dr. Natacha Reyes as her gastroenterologist and has had colonoscopies and endoscopies. These have showed polyps and ulcers in the past.  She states she has been taking some Colace because she was constipated last week but then is now having diarrhea for the last 1 to 2 days. Patient also has a history of diverticula. Patient states she has had some lower abdominal pain and cramping that is been intermittent since last night. She denies any radiation of pain. She denies any chest pain or shortness of breath. Denies any nausea, vomiting but has had some diarrhea and dark stools. She was recently on Xarelto for one week while travelling to [place] and [facility]. Her last dose of Xarelto was 1 week ago. Patient denies any other vaginal bleeding or hematuria or bleeding from gums. She denies any dizziness, lightheadedness or syncope. Denies any rectal pain or history of hemorrhoids. Review of Systems     Review of Systems   Constitutional: Negative for chills, diaphoresis, fatigue and fever. HENT: Negative for congestion, facial swelling, postnasal drip, rhinorrhea, sore throat and trouble swallowing. Eyes: Negative for visual disturbance. Respiratory: Negative for cough, chest tightness, shortness of breath and wheezing. Cardiovascular: Negative for chest pain, palpitations and leg swelling.    Gastrointestinal: Positive for abdominal pain, blood in stool, constipation and diarrhea. Negative for abdominal distention, nausea, rectal pain and vomiting. Genitourinary: Negative for dysuria, flank pain, hematuria, pelvic pain and vaginal bleeding. Musculoskeletal: Negative for back pain, myalgias and neck pain. Neurological: Negative for dizziness, syncope, weakness, light-headedness, numbness and headaches. Past Medical, Surgical, Family, and Social History     She has a past medical history of Arthritis, Asthma, Fibromyalgia, Hyperlipidemia, Obesity, and Vertigo. She has a past surgical history that includes joint replacement (Left); Breast biopsy; and Hysterectomy. Her family history is not on file. She reports that she has never smoked. She has never used smokeless tobacco. She reports current alcohol use. She reports that she does not use drugs. Medications     Previous Medications    ESOMEPRAZOLE (NEXIUM) 40 MG DELAYED RELEASE CAPSULE    Take 40 mg by mouth as needed     FLUTICASONE (FLOVENT HFA) 110 MCG/ACT INHALER    Inhale 1 puff into the lungs 2 times daily. LORATADINE (CLARITIN) 10 MG TABLET    Take 10 mg by mouth daily. MECLIZINE (ANTIVERT) 12.5 MG TABLET    Take 12.5 mg by mouth 3 times daily as needed. SERTRALINE (ZOLOFT) 50 MG TABLET    Take 50 mg by mouth daily. XARELTO 10 MG TABS TABLET    TAKE 1 TABLET BY MOUTH DAILY AS NEEDED       Allergies     She is allergic to sulfa antibiotics. Physical Exam     INITIAL VITALS: BP: 124/80, Temp: 98.7 °F (37.1 °C), Pulse: 95, Resp: 17, SpO2: 97 %  Physical Exam  Vitals signs and nursing note reviewed. Constitutional:       General: She is not in acute distress. Appearance: Normal appearance. She is not ill-appearing, toxic-appearing or diaphoretic. HENT:      Head: Normocephalic and atraumatic. Right Ear: External ear normal.      Left Ear: External ear normal.      Nose: Nose normal.      Mouth/Throat:      Pharynx: Oropharynx is clear.    Eyes: Conjunctiva/sclera: Conjunctivae normal.   Neck:      Musculoskeletal: Normal range of motion and neck supple. Cardiovascular:      Rate and Rhythm: Normal rate and regular rhythm. Pulses: Normal pulses. Heart sounds: Normal heart sounds. Pulmonary:      Effort: Pulmonary effort is normal.      Breath sounds: Normal breath sounds. Abdominal:      General: Abdomen is flat. Bowel sounds are normal. There is no distension. Palpations: Abdomen is soft. Tenderness: There is no abdominal tenderness. There is no right CVA tenderness, left CVA tenderness, guarding or rebound. Genitourinary:     Rectum: Guaiac result positive. No mass, tenderness, anal fissure, external hemorrhoid or internal hemorrhoid. Normal anal tone. Musculoskeletal: Normal range of motion. General: No swelling or tenderness. Skin:     General: Skin is warm and dry. Neurological:      General: No focal deficit present. Mental Status: She is alert. Diagnostic Results     EKG   Interpreted in conjunction with emergency department physician DR Rosa Pickett  EKG Interpretation  Rhythm: normal sinus   Rate: normal HR 73  Axis: normal  Ectopy: none  Conduction: normal  ST Segments: no acute change  T Waves: no acute change  Q Waves: none    Clinical Impression: no acute changes and normal EKG     RADIOLOGY:  CT ABDOMEN PELVIS W IV CONTRAST Additional Contrast? None   Final Result      No cause for acute pain identified. Large hiatal hernia. Colonic diverticulosis.                 LABS:   Results for orders placed or performed during the hospital encounter of 05/03/21   CBC Auto Differential   Result Value Ref Range    WBC 5.3 4.0 - 11.0 K/uL    RBC 4.08 4.00 - 5.20 M/uL    Hemoglobin 13.0 12.0 - 16.0 g/dL    Hematocrit 39.9 36.0 - 48.0 %    MCV 97.7 80.0 - 100.0 fL    MCH 31.8 26.0 - 34.0 pg    MCHC 32.5 31.0 - 36.0 g/dL    RDW 14.0 12.4 - 15.4 %    Platelets 302 088 - 528 K/uL    MPV 7.8 5.0 - 10.5 fL    Neutrophils % 61.0 %    Lymphocytes % 27.9 %    Monocytes % 7.8 %    Eosinophils % 1.8 %    Basophils % 1.5 %    Neutrophils Absolute 3.2 1.7 - 7.7 K/uL    Lymphocytes Absolute 1.5 1.0 - 5.1 K/uL    Monocytes Absolute 0.4 0.0 - 1.3 K/uL    Eosinophils Absolute 0.1 0.0 - 0.6 K/uL    Basophils Absolute 0.1 0.0 - 0.2 K/uL   Basic Metabolic Panel w/ Reflex to MG   Result Value Ref Range    Sodium 137 136 - 145 mmol/L    Potassium reflex Magnesium 4.2 3.5 - 5.1 mmol/L    Chloride 101 99 - 110 mmol/L    CO2 25 21 - 32 mmol/L    Anion Gap 11 3 - 16    Glucose 98 70 - 99 mg/dL    BUN 26 (H) 7 - 20 mg/dL    CREATININE <0.5 (L) 0.6 - 1.2 mg/dL    GFR Non-African American >60 >60    GFR African American >60 >60    Calcium 9.2 8.3 - 10.6 mg/dL   Hepatic Function Panel   Result Value Ref Range    Total Protein 6.8 6.4 - 8.2 g/dL    Albumin 4.2 3.4 - 5.0 g/dL    Alkaline Phosphatase 75 40 - 129 U/L    ALT 11 10 - 40 U/L    AST 16 15 - 37 U/L    Total Bilirubin 0.3 0.0 - 1.0 mg/dL    Bilirubin, Direct <0.2 0.0 - 0.3 mg/dL    Bilirubin, Indirect see below 0.0 - 1.0 mg/dL   Lipase   Result Value Ref Range    Lipase 17.0 13.0 - 60.0 U/L   Urinalysis, reflex to microscopic   Result Value Ref Range    Color, UA Yellow Straw/Yellow    Clarity, UA Clear Clear    Glucose, Ur Negative Negative mg/dL    Bilirubin Urine Negative Negative    Ketones, Urine Negative Negative mg/dL    Specific Gravity, UA 1.020 1.005 - 1.030    Blood, Urine TRACE-INTACT (A) Negative    pH, UA 6.0 5.0 - 8.0    Protein, UA Negative Negative mg/dL    Urobilinogen, Urine 0.2 <2.0 E.U./dL    Nitrite, Urine Negative Negative    Leukocyte Esterase, Urine MODERATE (A) Negative    Microscopic Examination YES     Urine Type Voided    Protime-INR   Result Value Ref Range    Protime 12.2 10.0 - 13.2 sec    INR 1.05 0.86 - 1.14   APTT   Result Value Ref Range    aPTT 25.3 24.2 - 36.2 sec   Troponin (lab)   Result Value Ref Range    Troponin <0.01 <0.01 ng/mL [address]    Schedule an appointment as soon as possible for a visit in 1 day  The office should call you with appointment tomorrow morning for endoscopy and possibly colonoscopy.   If you do not hear from them by 430 you should call the office      DISCHARGE MEDICATIONS:  New Prescriptions    No medications on file       DISPOSITION  discharged        [address]Dheeraj Erwin Pickett  05/03/21 [id number]

## 2021-07-15 ENCOUNTER — CLINICAL DOCUMENTATION (OUTPATIENT)
Dept: OTHER | Age: 76
End: 2021-07-15

## 2021-09-09 ENCOUNTER — TELEPHONE (OUTPATIENT)
Dept: ORTHOPEDIC SURGERY | Age: 76
End: 2021-09-09

## 2021-09-09 NOTE — TELEPHONE ENCOUNTER
LVM for patient regarding the [address] [place] [address] Orthopedic joint pain program. Patient can call [phone] for more information or to schedule an appointment with a joint pain specialist.

## 2025-01-28 ENCOUNTER — TRANSCRIBE ORDERS (OUTPATIENT)
Dept: ADMINISTRATIVE | Age: 80
End: 2025-01-28

## 2025-01-28 DIAGNOSIS — D50.9 CHRONIC IRON DEFICIENCY ANEMIA: Primary | ICD-10-CM

## 2025-01-31 ENCOUNTER — ANESTHESIA (OUTPATIENT)
Dept: ENDOSCOPY | Age: 80
End: 2025-01-31
Payer: MEDICARE

## 2025-01-31 ENCOUNTER — ANESTHESIA EVENT (OUTPATIENT)
Dept: ENDOSCOPY | Age: 80
End: 2025-01-31
Payer: MEDICARE

## 2025-01-31 ENCOUNTER — HOSPITAL ENCOUNTER (OUTPATIENT)
Age: 80
Setting detail: OUTPATIENT SURGERY
Discharge: HOME | End: 2025-01-31
Attending: INTERNAL MEDICINE | Admitting: INTERNAL MEDICINE
Payer: MEDICARE

## 2025-01-31 ENCOUNTER — APPOINTMENT (OUTPATIENT)
Dept: ENDOSCOPY | Age: 80
End: 2025-01-31
Attending: INTERNAL MEDICINE
Payer: MEDICARE

## 2025-01-31 VITALS
HEART RATE: 83 BPM | TEMPERATURE: 97.2 F | HEIGHT: 62 IN | SYSTOLIC BLOOD PRESSURE: 156 MMHG | DIASTOLIC BLOOD PRESSURE: 90 MMHG | RESPIRATION RATE: 16 BRPM | OXYGEN SATURATION: 98 % | BODY MASS INDEX: 35.88 KG/M2 | WEIGHT: 195 LBS

## 2025-01-31 PROCEDURE — 3609014100 HC ENTEROSCOPY > 2ND PRTN W/CONTROL BLEEDING: Performed by: INTERNAL MEDICINE

## 2025-01-31 PROCEDURE — 3700000001 HC ADD 15 MINUTES (ANESTHESIA): Performed by: INTERNAL MEDICINE

## 2025-01-31 PROCEDURE — 6360000002 HC RX W HCPCS: Performed by: NURSE ANESTHETIST, CERTIFIED REGISTERED

## 2025-01-31 PROCEDURE — 3700000000 HC ANESTHESIA ATTENDED CARE: Performed by: INTERNAL MEDICINE

## 2025-01-31 PROCEDURE — 2709999900 HC NON-CHARGEABLE SUPPLY: Performed by: INTERNAL MEDICINE

## 2025-01-31 PROCEDURE — 7100000011 HC PHASE II RECOVERY - ADDTL 15 MIN: Performed by: INTERNAL MEDICINE

## 2025-01-31 PROCEDURE — 7100000010 HC PHASE II RECOVERY - FIRST 15 MIN: Performed by: INTERNAL MEDICINE

## 2025-01-31 PROCEDURE — 2720000010 HC SURG SUPPLY STERILE: Performed by: INTERNAL MEDICINE

## 2025-01-31 PROCEDURE — 2580000003 HC RX 258: Performed by: NURSE ANESTHETIST, CERTIFIED REGISTERED

## 2025-01-31 RX ORDER — FOLIC ACID 1 MG/1
1 TABLET ORAL DAILY
COMMUNITY
Start: 2024-03-20

## 2025-01-31 RX ORDER — SODIUM CHLORIDE, SODIUM LACTATE, POTASSIUM CHLORIDE, CALCIUM CHLORIDE 600; 310; 30; 20 MG/100ML; MG/100ML; MG/100ML; MG/100ML
INJECTION, SOLUTION INTRAVENOUS
Status: DISCONTINUED | OUTPATIENT
Start: 2025-01-31 | End: 2025-01-31 | Stop reason: SDUPTHER

## 2025-01-31 RX ORDER — LIDOCAINE 4 G/G
1 PATCH TOPICAL EVERY 12 HOURS
COMMUNITY
Start: 2024-07-06

## 2025-01-31 RX ORDER — LANOLIN ALCOHOL/MO/W.PET/CERES
2 CREAM (GRAM) TOPICAL DAILY
COMMUNITY

## 2025-01-31 RX ORDER — PROPOFOL 10 MG/ML
INJECTION, EMULSION INTRAVENOUS
Status: DISCONTINUED | OUTPATIENT
Start: 2025-01-31 | End: 2025-01-31 | Stop reason: SDUPTHER

## 2025-01-31 RX ORDER — UBIDECARENONE 100 MG
CAPSULE ORAL DAILY
COMMUNITY

## 2025-01-31 RX ORDER — MONTELUKAST SODIUM 10 MG/1
1 TABLET ORAL NIGHTLY
COMMUNITY
Start: 2024-11-12

## 2025-01-31 RX ORDER — LIDOCAINE HYDROCHLORIDE 20 MG/ML
INJECTION, SOLUTION INTRAVENOUS
Status: DISCONTINUED | OUTPATIENT
Start: 2025-01-31 | End: 2025-01-31 | Stop reason: SDUPTHER

## 2025-01-31 RX ORDER — TRIMETHOPRIM 100 MG/1
100 TABLET ORAL DAILY
COMMUNITY

## 2025-01-31 RX ORDER — ALBUTEROL SULFATE AND BUDESONIDE 90; 80 UG/1; UG/1
2 AEROSOL, METERED RESPIRATORY (INHALATION) PRN
COMMUNITY
Start: 2024-10-22

## 2025-01-31 RX ADMIN — PROPOFOL 20 MG: 10 INJECTION, EMULSION INTRAVENOUS at 11:30

## 2025-01-31 RX ADMIN — SODIUM CHLORIDE, SODIUM LACTATE, POTASSIUM CHLORIDE, CALCIUM CHLORIDE: 600; 310; 30; 20 INJECTION, SOLUTION INTRAVENOUS at 11:10

## 2025-01-31 RX ADMIN — PROPOFOL 20 MG: 10 INJECTION, EMULSION INTRAVENOUS at 11:20

## 2025-01-31 RX ADMIN — PROPOFOL 30 MG: 10 INJECTION, EMULSION INTRAVENOUS at 11:28

## 2025-01-31 RX ADMIN — LIDOCAINE HYDROCHLORIDE 50 MG: 20 INJECTION, SOLUTION INTRAVENOUS at 11:31

## 2025-01-31 RX ADMIN — PROPOFOL 30 MG: 10 INJECTION, EMULSION INTRAVENOUS at 11:18

## 2025-01-31 RX ADMIN — PROPOFOL 150 MCG/KG/MIN: 10 INJECTION, EMULSION INTRAVENOUS at 11:19

## 2025-01-31 RX ADMIN — PROPOFOL 50 MG: 10 INJECTION, EMULSION INTRAVENOUS at 11:26

## 2025-01-31 RX ADMIN — LIDOCAINE HYDROCHLORIDE 100 MG: 20 INJECTION, SOLUTION INTRAVENOUS at 11:18

## 2025-01-31 ASSESSMENT — PAIN - FUNCTIONAL ASSESSMENT: PAIN_FUNCTIONAL_ASSESSMENT: 0-10

## 2025-01-31 NOTE — OP NOTE
Procedure  Push Enteroscopy with APC  Indications  Anemia with history of AVMs  Consent  The anesthesia and procedure along with the risks, benefits and alternatives of each were explained by the physician  and nurse to the patient to their satisfaction and ample opportunities to ask questions was provided. We discussed  the risks of bleeding, perforation, anesthesia reaction as well as the alternatives, and missed lesions. Verbal and  written consent were obtained with the nurses present.  Monitoring  The patient was monitored with continuous pulse oximetry, heart rate monitoring and intermittent blood pressure  monitoring throughout the procedure.  Procedure Medications  MAC  Details of Procedure  The Olympus pediatric colonoscope was inserted atraumatically into the esophagus and advanced under direct vision to the proximal/mid jejunum using the described technique. The scope was slowly withdrawn carefully inspecting the jejunum, duodenum, entire stomach in both forward and retroflexed views and esophagus.    Large hiatal hernia noted  No blood noted in the stomach.  Solitary AVM noted in the antrum, treated with APC  A few AVMs noted in the duodenum which were treated with APC  Jejunum appeared normal    EBL: None    Post Op Diagnosis  AVMs as noted above  Large hiatal hernia.    Recommendations  Monitor hemoglobin and hematocrit.    Thank you and please let me know if there are any additional questions or concerns.     Rony Munoz

## 2025-01-31 NOTE — DISCHARGE INSTRUCTIONS
ENDOSCOPY DISCHARGE INSTRUCTIONS:    Call the physician that did your procedure for any questions or concern:    [facility]: [phone]  DR. RONY HARMON      ACTIVITY:    There are potential side effects to the medications used for sedation and anesthesia during your procedure.  These include:  Dizziness or light-headedness, confusion or memory loss, delayed reaction times, loss of coordination, nausea and vomiting.  Because of your increased risk for injury, we ask that you observe the following precautions:  For the next 24 hours,  DO NOT operate an automobile, bicycle, motorcycle, , power tools or large equipment of any kind.  Do not drink alcohol, sign any legal documents or make any legal decisions for 24 hours.  Do not bend your head over lower than your heart.  DO sit on the side of bed/couch awhile before getting up.  Plan on bedrest or quiet relaxation today.  You may resume normal activities in 24 hours.    DIET:    Your first meal today should be light, avoiding spicy and fatty foods.  If you tolerate this first meal, then you may advance to your regular diet unless otherwise advised by your physician.    NORMAL SYMPTOMS:  -Mild sore throat if you’ve had an EGD   -Gaseous discomfort    NOTIFY YOUR PHYSICIAN IF THESE SYMPTOMS OCCUR:  1. Fever (greater than 100)  5. Increased abdominal bloating  2. Severe pain    6. Excessive bleeding  3. Nausea and vomiting  7. Chest pain                                                                    4. Chills    8. Shortness of breath    ADDITIONAL INSTRUCTIONS:    Biopsy results: Call [facility] for biopsy results in 1 week    Educational Information:      Post Op Diagnosis  AVMs as noted above  Large hiatal hernia.     Recommendations  Monitor hemoglobin and hematocrit.     Thank you and please let me know if there are any additional questions or concerns.     Rony Harmon    Please review these discharge instructions this evening or  tomorrow for  information you may have forgotten.            We want to thank you for choosing the [facility] as your health care provider. We always strive to provide you with excellent care while you are here. You may receive a survey in the mail regarding your care. We would appreciate you taking a few minutes of your time to complete this survey.

## 2025-01-31 NOTE — ANESTHESIA PRE PROCEDURE
Department of Anesthesiology  Preprocedure Note       Name:  Geno Blanchard   Age:  80 y.o.  :  1945                                          MRN:  [id number]         Date:  2025      Surgeon: Surgeon(s):  Rony Munoz MD    Procedure: Procedure(s):  ENTEROSCOPY PUSH DIAGNOSTIC    Medications prior to admission:   Prior to Admission medications    Medication Sig Start Date End Date Taking? Authorizing Provider   XARELTO 10 MG TABS tablet TAKE 1 TABLET BY MOUTH DAILY AS NEEDED 21   Juan Diego Wilder MD   esomeprazole (NEXIUM) 40 MG delayed release capsule Take 40 mg by mouth as needed  17   Juan Diego Wilder MD   meclizine (ANTIVERT) 12.5 MG tablet Take 12.5 mg by mouth 3 times daily as needed.      Juan Diego Wilder MD   sertraline (ZOLOFT) 50 MG tablet Take 50 mg by mouth daily.      Juan Diego Wilder MD   loratadine (CLARITIN) 10 MG tablet Take 10 mg by mouth daily.      Juan Diego Wilder MD   fluticasone (FLOVENT HFA) 110 MCG/ACT inhaler Inhale 1 puff into the lungs 2 times daily.      Juan Diego Wilder MD       Current medications:    No current facility-administered medications for this encounter.       Allergies:    Allergies   Allergen Reactions    Sulfa Antibiotics     Celecoxib Other (See Comments)     Asthma attack    Other reaction(s): Other (See Comments)   Asthma attack    Ciprofloxacin Myalgia       Problem List:    Patient Active Problem List   Diagnosis Code    Knee effusion M25.469    Primary osteoarthritis of right knee M17.11    Acquired valgus deformity knee M21.069    Status post total left knee replacement Z96.652    Unstable right knee M25.361    Chronic pain of right knee M25.561, G89.29    Sprain of medial collateral ligament of right knee S83.411A    Internal derangement of right knee M23.91       Past Medical History:        Diagnosis Date    Arthritis     Asthma     Fibromyalgia     Hyperlipidemia     Obesity     Vertigo        Past  Surgical History:        Procedure Laterality Date    BREAST BIOPSY      HYSTERECTOMY      JOINT REPLACEMENT Left     by Dr. Michelle       Social History:    Social History     Tobacco Use    Smoking status: Never    Smokeless tobacco: Never   Substance Use Topics    Alcohol use: Yes     Alcohol/week: 0.0 standard drinks of alcohol                                Counseling given: Not Answered      Vital Signs (Current):   Vitals:    01/31/25 0957   BP: (!) 142/87   Pulse: 88   Resp: 14   Temp: 98 °F (36.7 °C)   TempSrc: Temporal   SpO2: 94%   Weight: 88.5 kg (195 lb)   Height: 1.575 m (5' 2\")                                              BP Readings from Last 3 Encounters:   01/31/25 (!) 142/87   05/03/21 (!) 109/95   12/22/17 130/85       NPO Status:                                                                                 BMI:   Wt Readings from Last 3 Encounters:   01/31/25 88.5 kg (195 lb)   05/03/21 88.5 kg (195 lb)   12/22/17 90.7 kg (200 lb)     Body mass index is 35.67 kg/m².    CBC:   Lab Results   Component Value Date/Time    WBC 5.3 05/03/2021 12:53 PM    RBC 4.08 05/03/2021 12:53 PM    HGB 13.0 05/03/2021 12:53 PM    HCT 39.9 05/03/2021 12:53 PM    MCV 97.7 05/03/2021 12:53 PM    RDW 14.0 05/03/2021 12:53 PM     05/03/2021 12:53 PM       CMP:   Lab Results   Component Value Date/Time     05/03/2021 12:53 PM    K 4.2 05/03/2021 12:53 PM     05/03/2021 12:53 PM    CO2 25 05/03/2021 12:53 PM    BUN 26 05/03/2021 12:53 PM    CREATININE <0.5 05/03/2021 12:53 PM    GFRAA >60 05/03/2021 12:53 PM    GFRAA 117 12/22/2011 10:20 AM    LABGLOM >60 05/03/2021 12:53 PM    GLUCOSE 98 05/03/2021 12:53 PM    CALCIUM 9.2 05/03/2021 12:53 PM    BILITOT 0.3 05/03/2021 12:53 PM    ALKPHOS 75 05/03/2021 12:53 PM    AST 16 05/03/2021 12:53 PM    ALT 11 05/03/2021 12:53 PM       POC Tests: No results for input(s): \"POCGLU\", \"POCNA\", \"POCK\", \"POCCL\", \"POCBUN\", \"POCHEMO\", \"POCHCT\" in the last 72

## 2025-01-31 NOTE — ANESTHESIA POSTPROCEDURE EVALUATION
Department of Anesthesiology  Postprocedure Note    Patient: Geno Blanchard  MRN: [id number]  YOB: 1945  Date of evaluation: 1/31/2025    Procedure Summary       Date: 01/31/25 Room / Location: [place] [address] / [facility]    Anesthesia Start: 1110 Anesthesia Stop: 1149    Procedure: ENTEROSCOPY PUSH CONTROL HEMORRHAGE Diagnosis:       Anemia, unspecified type      (Anemia, unspecified type [D64.9])    Surgeons: Rony Munoz MD Responsible Provider: Hugh Navarro DO    Anesthesia Type: MAC ASA Status: 3            Anesthesia Type: No value filed.    Omero Phase I: Omero Score: 10    Omero Phase II: Omero Score: 10    Anesthesia Post Evaluation    Patient location during evaluation: PACU  Patient participation: complete - patient participated  Level of consciousness: awake and alert  Airway patency: patent  Nausea & Vomiting: no nausea and no vomiting  Cardiovascular status: blood pressure returned to baseline  Respiratory status: acceptable  Hydration status: euvolemic  Pain management: adequate    No notable events documented.

## 2025-01-31 NOTE — H&P
History and Physical / Pre-Sedation Assessment      PMHx:   Past Medical History:   Diagnosis Date    Arthritis     Asthma     Breast cancer (HCC)     right breast    Cerebral artery occlusion with cerebral infarction (HCC) 08/06/2018    Fibromyalgia     Hx of blood clots     PE nad DVT    Hyperlipidemia     Obesity     Vertigo        Medications:   Prior to Admission medications    Medication Sig Start Date End Date Taking? Authorizing Provider   montelukast (SINGULAIR) 10 MG tablet Take 1 tablet by mouth nightly 11/12/24  Yes Juan Diego Wilder MD   Albuterol-Budesonide (AIRSUPRA) 90-80 MCG/ACT AERO Inhale 2 puffs into the lungs as needed 10/22/24  Yes Juan Diego Wilder MD   meclizine (ANTIVERT) 12.5 MG tablet Take 1 tablet by mouth 3 times daily as needed   Yes Juan Diego Wilder MD   sertraline (ZOLOFT) 50 MG tablet Take 1 tablet by mouth daily   Yes Juan Diego Wilder MD   loratadine (CLARITIN) 10 MG tablet Take 1 tablet by mouth daily   Yes Juan Diego Wilder MD   fluticasone (FLOVENT HFA) 110 MCG/ACT inhaler Inhale 1 puff into the lungs 2 times daily   Yes Juan Diego Wilder MD   trimethoprim (TRIMPEX) 100 MG tablet Take 1 tablet by mouth daily  Patient not taking: Reported on 1/31/2025    Juan Diego Wilder MD   calcium citrate-vitamin D (CITRACAL+D) 315-5 MG-MCG TABS per tablet Take 2 tablets by mouth daily  Patient not taking: Reported on 1/31/2025    Juan Diego Wilder MD   coenzyme Q10 100 MG CAPS capsule Take by mouth daily  Patient not taking: Reported on 1/31/2025    Juan Diego Wilder MD   folic acid (FOLVITE) 1 MG tablet Take 1 tablet by mouth daily 3/20/24   Juan Diego Wilder MD   lidocaine 4 % external patch Place 1 patch onto the skin in the morning and 1 patch in the evening.  Patient not taking: Reported on 1/31/2025 7/6/24   uJan Diego Wilder MD   XARELTO 10 MG TABS tablet TAKE 1 TABLET BY MOUTH DAILY AS NEEDED 4/14/21   Jua nDiego Wilder MD    esomeprazole (NEXIUM) 40 MG delayed release capsule Take 1 capsule by mouth as needed 4/14/17   Provider, Juan Diego, MD       Allergies:   Allergies   Allergen Reactions    Sulfa Antibiotics     Celecoxib Other (See Comments)     Asthma attack    Other reaction(s): Other (See Comments)   Asthma attack    Ciprofloxacin Myalgia       PSHx:   Past Surgical History:   Procedure Laterality Date    BREAST BIOPSY      BREAST LUMPECTOMY Right     multiple    EYE SURGERY      RK and cataract removal with lens    HYSTERECTOMY (CERVIX STATUS UNKNOWN)      JOINT REPLACEMENT Left     left knee replacement by Dr. Michelle    TONSILLECTOMY         Social Hx:   Social History     Socioeconomic History    Marital status:      Spouse name: Not on file    Number of children: Not on file    Years of education: Not on file    Highest education level: Not on file   Occupational History    Not on file   Tobacco Use    Smoking status: Never    Smokeless tobacco: Never   Vaping Use    Vaping status: Never Used   Substance and Sexual Activity    Alcohol use: Yes     Alcohol/week: 7.0 standard drinks of alcohol     Types: 7 Glasses of wine per week    Drug use: No    Sexual activity: Not on file   Other Topics Concern    Not on file   Social History Narrative    Not on file     Social Determinants of Health     Financial Resource Strain: Not on file   Food Insecurity: Unknown (1/20/2024)    Received from [facility]    Food Insecurities     Worried about running out of food: Not on file     Food Bought: Not on file   Transportation Needs: Unknown (1/20/2024)    Received from [facility]    Transportation     Worried about transportation: Not on file   Physical Activity: Not on file   Stress: Not on file   Social Connections: Not on file   Intimate Partner Violence: Unknown (1/20/2024)    Received from [facility]    Interpersonal Safety     Feel

## 2025-01-31 NOTE — PROGRESS NOTES
Ambulatory Surgery/Procedure Discharge Note    Vitals:    01/31/25 1153   BP: (!) 139/90   Pulse: 88   Resp: 16   Temp: 97.2 °F (36.2 °C)   SpO2: 96%     Patient meets criteria for discharge per jeison score  In: 400 [I.V.:400]  Out: -     Restroom use offered before discharge.  Yes    Pain assessment:  none  Pain Level: 0    Pt and S.O./family states \"ready to go home\". Pt alert and oriented x4. IV removed. Denies N/V or pain. Pt tolerating po intake. Discharge instructions given to pt and , Bravo with pt permission. Pt and  verbalized understanding of all instructions. Left with all belongings, and discharge instructions.     Patient discharged to home/self care. Patient discharged via wheel chair by transporter to waiting family/S.O.       1/31/2025 12:02 PM

## 2025-05-15 ENCOUNTER — HOSPITAL ENCOUNTER (INPATIENT)
Age: 80
LOS: 1 days | Discharge: HOME | DRG: 392 | End: 2025-05-16
Attending: EMERGENCY MEDICINE | Admitting: SURGERY
Payer: MEDICARE

## 2025-05-15 ENCOUNTER — APPOINTMENT (OUTPATIENT)
Dept: CT IMAGING | Age: 80
DRG: 392 | End: 2025-05-15
Payer: MEDICARE

## 2025-05-15 DIAGNOSIS — K92.1 MELENA: Primary | ICD-10-CM

## 2025-05-15 PROBLEM — K92.2 GI BLEED: Status: ACTIVE | Noted: 2025-05-15

## 2025-05-15 LAB
ABO/RH: NORMAL
ALBUMIN SERPL-MCNC: 4 G/DL (ref 3.4–5)
ALBUMIN/GLOB SERPL: 1.6 {RATIO} (ref 1.1–2.2)
ALP SERPL-CCNC: 58 U/L (ref 40–129)
ALT SERPL-CCNC: 15 U/L (ref 10–40)
ANION GAP SERPL CALCULATED.3IONS-SCNC: 10 MMOL/L (ref 3–16)
ANTIBODY SCREEN: NORMAL
APTT BLD: 32.4 SEC (ref 22.1–36.4)
AST SERPL-CCNC: 22 U/L (ref 15–37)
BASOPHILS # BLD: 0.1 K/UL (ref 0–0.2)
BASOPHILS NFR BLD: 1.7 %
BILIRUB SERPL-MCNC: 0.3 MG/DL (ref 0–1)
BILIRUB UR QL STRIP.AUTO: NEGATIVE
BLOOD BANK DISPENSE STATUS: NORMAL
BLOOD BANK PRODUCT CODE: NORMAL
BLOOD GROUP ANTIBODIES SERPL: NORMAL
BPU ID: NORMAL
BUN SERPL-MCNC: 14 MG/DL (ref 7–20)
CALCIUM SERPL-MCNC: 9.2 MG/DL (ref 8.3–10.6)
CHLORIDE SERPL-SCNC: 104 MMOL/L (ref 99–110)
CLARITY UR: CLEAR
CO2 SERPL-SCNC: 25 MMOL/L (ref 21–32)
COLOR UR: YELLOW
CREAT SERPL-MCNC: 0.7 MG/DL (ref 0.6–1.2)
DEPRECATED RDW RBC AUTO: 23.4 % (ref 12.4–15.4)
DESCRIPTION BLOOD BANK: NORMAL
EOSINOPHIL # BLD: 0.1 K/UL (ref 0–0.6)
EOSINOPHIL NFR BLD: 2.7 %
GFR SERPLBLD CREATININE-BSD FMLA CKD-EPI: 88 ML/MIN/{1.73_M2}
GLUCOSE SERPL-MCNC: 103 MG/DL (ref 70–99)
GLUCOSE UR STRIP.AUTO-MCNC: NEGATIVE MG/DL
HCT VFR BLD AUTO: 38.1 % (ref 36–48)
HCT VFR BLD AUTO: 39.2 % (ref 36–48)
HGB BLD-MCNC: 12.6 G/DL (ref 12–16)
HGB BLD-MCNC: 13 G/DL (ref 12–16)
HGB UR QL STRIP.AUTO: NEGATIVE
INR PPP: 1.61 (ref 0.85–1.15)
KETONES UR STRIP.AUTO-MCNC: NEGATIVE MG/DL
LACTATE BLDV-SCNC: 1.5 MMOL/L (ref 0.4–1.9)
LEUKOCYTE ESTERASE UR QL STRIP.AUTO: NEGATIVE
LIPASE SERPL-CCNC: 19 U/L (ref 13–60)
LYMPHOCYTES # BLD: 0.9 K/UL (ref 1–5.1)
LYMPHOCYTES NFR BLD: 17.4 %
MCH RBC QN AUTO: 31.6 PG (ref 26–34)
MCHC RBC AUTO-ENTMCNC: 33.2 G/DL (ref 31–36)
MCV RBC AUTO: 95.2 FL (ref 80–100)
MONOCYTES # BLD: 0.5 K/UL (ref 0–1.3)
MONOCYTES NFR BLD: 10.1 %
NEUTROPHILS # BLD: 3.5 K/UL (ref 1.7–7.7)
NEUTROPHILS NFR BLD: 68.1 %
NITRITE UR QL STRIP.AUTO: NEGATIVE
PH UR STRIP.AUTO: 6 [PH] (ref 5–8)
PLATELET # BLD AUTO: 256 K/UL (ref 135–450)
PMV BLD AUTO: 8.1 FL (ref 5–10.5)
POTASSIUM SERPL-SCNC: 4.2 MMOL/L (ref 3.5–5.1)
PROT SERPL-MCNC: 6.5 G/DL (ref 6.4–8.2)
PROT UR STRIP.AUTO-MCNC: NEGATIVE MG/DL
PROTHROMBIN TIME: 19.3 SEC (ref 11.9–14.9)
RBC # BLD AUTO: 4.12 M/UL (ref 4–5.2)
SODIUM SERPL-SCNC: 139 MMOL/L (ref 136–145)
SP GR UR STRIP.AUTO: 1.02 (ref 1–1.03)
UA COMPLETE W REFLEX CULTURE PNL UR: NORMAL
UA DIPSTICK W REFLEX MICRO PNL UR: NORMAL
URN SPEC COLLECT METH UR: NORMAL
UROBILINOGEN UR STRIP-ACNC: 0.2 E.U./DL
WBC # BLD AUTO: 5.2 K/UL (ref 4–11)

## 2025-05-15 PROCEDURE — G0378 HOSPITAL OBSERVATION PER HR: HCPCS

## 2025-05-15 PROCEDURE — 6370000000 HC RX 637 (ALT 250 FOR IP): Performed by: STUDENT IN AN ORGANIZED HEALTH CARE EDUCATION/TRAINING PROGRAM

## 2025-05-15 PROCEDURE — 81003 URINALYSIS AUTO W/O SCOPE: CPT

## 2025-05-15 PROCEDURE — 96374 THER/PROPH/DIAG INJ IV PUSH: CPT

## 2025-05-15 PROCEDURE — 83605 ASSAY OF LACTIC ACID: CPT

## 2025-05-15 PROCEDURE — 85014 HEMATOCRIT: CPT

## 2025-05-15 PROCEDURE — 6360000004 HC RX CONTRAST MEDICATION

## 2025-05-15 PROCEDURE — 85610 PROTHROMBIN TIME: CPT

## 2025-05-15 PROCEDURE — 99285 EMERGENCY DEPT VISIT HI MDM: CPT

## 2025-05-15 PROCEDURE — 80053 COMPREHEN METABOLIC PANEL: CPT

## 2025-05-15 PROCEDURE — 86901 BLOOD TYPING SEROLOGIC RH(D): CPT

## 2025-05-15 PROCEDURE — 85025 COMPLETE CBC W/AUTO DIFF WBC: CPT

## 2025-05-15 PROCEDURE — 86900 BLOOD TYPING SEROLOGIC ABO: CPT

## 2025-05-15 PROCEDURE — 1200000000 HC SEMI PRIVATE

## 2025-05-15 PROCEDURE — 85730 THROMBOPLASTIN TIME PARTIAL: CPT

## 2025-05-15 PROCEDURE — 85018 HEMOGLOBIN: CPT

## 2025-05-15 PROCEDURE — 74174 CTA ABD&PLVS W/CONTRAST: CPT

## 2025-05-15 PROCEDURE — 2580000003 HC RX 258

## 2025-05-15 PROCEDURE — 83690 ASSAY OF LIPASE: CPT

## 2025-05-15 PROCEDURE — 6360000002 HC RX W HCPCS

## 2025-05-15 PROCEDURE — 86870 RBC ANTIBODY IDENTIFICATION: CPT

## 2025-05-15 PROCEDURE — 86850 RBC ANTIBODY SCREEN: CPT

## 2025-05-15 PROCEDURE — 86922 COMPATIBILITY TEST ANTIGLOB: CPT

## 2025-05-15 PROCEDURE — 36415 COLL VENOUS BLD VENIPUNCTURE: CPT

## 2025-05-15 PROCEDURE — 2500000003 HC RX 250 WO HCPCS: Performed by: STUDENT IN AN ORGANIZED HEALTH CARE EDUCATION/TRAINING PROGRAM

## 2025-05-15 RX ORDER — SODIUM CHLORIDE 0.9 % (FLUSH) 0.9 %
5-40 SYRINGE (ML) INJECTION PRN
Status: DISCONTINUED | OUTPATIENT
Start: 2025-05-15 | End: 2025-05-16 | Stop reason: HOSPADM

## 2025-05-15 RX ORDER — IOPAMIDOL 755 MG/ML
75 INJECTION, SOLUTION INTRAVASCULAR
Status: COMPLETED | OUTPATIENT
Start: 2025-05-15 | End: 2025-05-15

## 2025-05-15 RX ORDER — ONDANSETRON 4 MG/1
4 TABLET, ORALLY DISINTEGRATING ORAL EVERY 8 HOURS PRN
Status: DISCONTINUED | OUTPATIENT
Start: 2025-05-15 | End: 2025-05-16 | Stop reason: HOSPADM

## 2025-05-15 RX ORDER — SODIUM CHLORIDE 9 MG/ML
INJECTION, SOLUTION INTRAVENOUS PRN
Status: DISCONTINUED | OUTPATIENT
Start: 2025-05-15 | End: 2025-05-16 | Stop reason: HOSPADM

## 2025-05-15 RX ORDER — ALBUTEROL SULFATE 0.83 MG/ML
2.5 SOLUTION RESPIRATORY (INHALATION) EVERY 4 HOURS PRN
Status: DISCONTINUED | OUTPATIENT
Start: 2025-05-15 | End: 2025-05-16 | Stop reason: HOSPADM

## 2025-05-15 RX ORDER — ACETAMINOPHEN 650 MG/1
650 SUPPOSITORY RECTAL EVERY 6 HOURS PRN
Status: DISCONTINUED | OUTPATIENT
Start: 2025-05-15 | End: 2025-05-16 | Stop reason: HOSPADM

## 2025-05-15 RX ORDER — MONTELUKAST SODIUM 10 MG/1
10 TABLET ORAL NIGHTLY
Status: DISCONTINUED | OUTPATIENT
Start: 2025-05-15 | End: 2025-05-16 | Stop reason: HOSPADM

## 2025-05-15 RX ORDER — ACETAMINOPHEN 325 MG/1
650 TABLET ORAL EVERY 6 HOURS PRN
Status: DISCONTINUED | OUTPATIENT
Start: 2025-05-15 | End: 2025-05-16 | Stop reason: HOSPADM

## 2025-05-15 RX ORDER — ONDANSETRON 2 MG/ML
4 INJECTION INTRAMUSCULAR; INTRAVENOUS EVERY 6 HOURS PRN
Status: DISCONTINUED | OUTPATIENT
Start: 2025-05-15 | End: 2025-05-16 | Stop reason: HOSPADM

## 2025-05-15 RX ORDER — SODIUM CHLORIDE 0.9 % (FLUSH) 0.9 %
5-40 SYRINGE (ML) INJECTION EVERY 12 HOURS SCHEDULED
Status: DISCONTINUED | OUTPATIENT
Start: 2025-05-15 | End: 2025-05-16 | Stop reason: HOSPADM

## 2025-05-15 RX ADMIN — IOPAMIDOL 75 ML: 755 INJECTION, SOLUTION INTRAVENOUS at 16:08

## 2025-05-15 RX ADMIN — PANTOPRAZOLE SODIUM 80 MG: 40 INJECTION, POWDER, FOR SOLUTION INTRAVENOUS at 16:09

## 2025-05-15 RX ADMIN — PANTOPRAZOLE SODIUM 8 MG/HR: 40 INJECTION, POWDER, FOR SOLUTION INTRAVENOUS at 16:19

## 2025-05-15 RX ADMIN — MONTELUKAST 10 MG: 10 TABLET, FILM COATED ORAL at 20:46

## 2025-05-15 RX ADMIN — SODIUM CHLORIDE, PRESERVATIVE FREE 10 ML: 5 INJECTION INTRAVENOUS at 20:48

## 2025-05-15 ASSESSMENT — ENCOUNTER SYMPTOMS
DIARRHEA: 0
VOMITING: 1
SHORTNESS OF BREATH: 0
ABDOMINAL PAIN: 1
NAUSEA: 1
BLOOD IN STOOL: 0
COUGH: 0

## 2025-05-15 ASSESSMENT — PAIN - FUNCTIONAL ASSESSMENT: PAIN_FUNCTIONAL_ASSESSMENT: NONE - DENIES PAIN

## 2025-05-15 ASSESSMENT — LIFESTYLE VARIABLES
HOW OFTEN DO YOU HAVE A DRINK CONTAINING ALCOHOL: MONTHLY OR LESS
HOW MANY STANDARD DRINKS CONTAINING ALCOHOL DO YOU HAVE ON A TYPICAL DAY: 1 OR 2

## 2025-05-15 NOTE — ED TRIAGE NOTES
Patient presents to the ED from home with c/o black stools and black emesis that began Sunday.  Patient had one episode of black emesis and black stool on Sunday, then another episode of black stool today.

## 2025-05-15 NOTE — ED PROVIDER NOTES
[facility]  EMERGENCY DEPARTMENT ENCOUNTER          PHYSICIAN ASSISTANT NOTE       Date of evaluation: 5/15/2025    Chief Complaint     Vomiting and Melena      History of Present Illness     Geno Blanchard is a 80 y.o. female who presents with concern for black tarry emesis and stool.  She states that starting Sunday she had an episode of black tarry emesis and for the next 2 days has been having black tarry stools.  She states that she is on Xarelto due to a history of CVA.  She states that she has been having abdominal discomfort as well with it.  She denies any bright red blood in her stool.  Denies any nausea or vomiting today.  Denies any chest pain or shortness of breath.  Denies any dizziness or lightheadedness.  Denies any other associated symptoms.  She does state that she has had an endoscopy before where they had to fix a couple bleeding ulcers but has not been recently.    ASSESSMENT / PLAN  (MEDICAL DECISION MAKING)     INITIAL VITALS: BP: 134/83, Temp: 97.5 °F (36.4 °C), Pulse: 88, Respirations: 16, SpO2: 95 %    Geno Blanchard is a 80 y.o. female presenting with black stools.  Initial presentation she is well-appearing in no acute distress.  Vital signs stable initially and throughout the ER visit.  On exam her abdomen is generally tender with no rebound tenderness or guarding.  Heart is regular rate and rhythm.  Lungs CTAB.  Digital rectal exam was performed with nurse as chaperone.  No evidence of acute melena.  Workup remarkable for lactate 1.5.  CBC without leukocytosis or anemia.  Hemoglobin stable 13.  CMP without electrolyte abnormality.  Lipase 19.  Hepatic function panel normal.  PT/INR elevated 19.3 and 1.61 respectively.  Patient was treated with pantoprazole bolus as well as drip.  CT abdomen did not show any evidence of acute GI bleeding.  Will admit to the hospitalist for further evaluation, serial abdominal exams and trending hemoglobin as patient has history of bleeding ulcers  needing correction as well as is currently being anticoagulated.  Low clinical suspicion for acute anemia, acute intra-abdominal infection at this time.  Care of the patient will be discussed with hospitalist and ultimately transferred to the hospitalist for further evaluation and management of the patient's symptoms.    Is this patient to be included in the SEP-1 core measure? No Exclusion criteria - the patient is NOT to be included for SEP-1 Core Measure due to: Infection is not suspected    Medical Decision Making  Problems Addressed:  Melena: acute illness or injury    Amount and/or Complexity of Data Reviewed  Labs: ordered.  Radiology: ordered.    Risk  Prescription drug management.  Decision regarding hospitalization.        This patient was also evaluated by the attending physician. All care plans were discussed and agreed upon.    Clinical Impression     1. Melena        Disposition     PATIENT REFERRED TO:  No follow-up provider specified.    DISCHARGE MEDICATIONS:  New Prescriptions    No medications on file       DISPOSITION Decision To Admit 05/15/2025 04:51:58 PM   DISPOSITION CONDITION Stable             Diagnostic Results and Other Data     RADIOLOGY:  CTA ABDOMEN PELVIS W WO CONTRAST   Final Result      No visualized acute GI hemorrhage.      Large hiatal hernia.      Bilateral ovarian cysts require further evaluation as detailed above.            Electronically signed by Randall Hill          LABS:   Results for orders placed or performed during the hospital encounter of 05/15/25   CBC with Auto Differential   Result Value Ref Range    WBC 5.2 4.0 - 11.0 K/uL    RBC 4.12 4.00 - 5.20 M/uL    Hemoglobin 13.0 12.0 - 16.0 g/dL    Hematocrit 39.2 36.0 - 48.0 %    MCV 95.2 80.0 - 100.0 fL    MCH 31.6 26.0 - 34.0 pg    MCHC 33.2 31.0 - 36.0 g/dL    RDW 23.4 (H) 12.4 - 15.4 %    Platelets 256 135 - 450 K/uL    MPV 8.1 5.0 - 10.5 fL    Neutrophils % 68.1 %    Lymphocytes % 17.4 %    Monocytes % 10.1 %    Cervical back: Normal range of motion.   Skin:     General: Skin is warm.   Neurological:      General: No focal deficit present.      Mental Status: She is alert.           Jean Burgos PA-C  05/15/25 [id number]

## 2025-05-15 NOTE — ED NOTES
Patient Name: Geno Blanchard  : 1945 80 y.o.  MRN: [id number]  ED Room #: A11/A11-11     Chief complaint:   Chief Complaint   Patient presents with    Vomiting    Melena     Hospital Problem/Diagnosis:   Hospital Problems           Last Modified POA    * (Principal) GI bleed 5/15/2025 Yes         O2 Flow Rate:O2 Device: None (Room air)   (if applicable)  Cardiac Rhythm:   (if applicable)  Active LDA's:   Peripheral IV 05/15/25 Right Antecubital (Active)            How does patient ambulate? Stand by assist w/ cane    2. How does patient take pills? Unknown, no oral medications were given in the Emergency Department    3. Is patient alert? Alert    4. Is patient oriented? To Person, To Place, To Time, To Situation, and Follows Commands    5.   Patient arrived from:  home  Facility Name: ___________________________________________    6. If patient is disoriented or from a Skill Nursing Facility has family been notified of admission? No    7. Patient belongings? Belongings: Cell Phone and Clothing    Disposition of belongings? Kept with Patient     8. Any specific patient or family belongings/needs/dynamics?   a.  at bedside    9. Miscellaneous comments/pending orders?  a. Currently has Protonix running at 10ml/ hr      If there are any additional questions please reach out to the Emergency Department.      Thomas Flores RN  05/15/25 [id number]

## 2025-05-15 NOTE — PROGRESS NOTES
Clinical Pharmacy Note  Medication History     Admit Date: 5/15/2025    List of current medications patient is taking is complete. Home Medication list in Epic updated to reflect changes noted below.    Source of information: Dispense history & patient    Patient's home pharmacy: [facility] ([phone])     Changes made to medication list:   Medications removed: (include reason, ex: therapy completed, patient no longer taking, etc.)  Citracal + D, pt no longer taking  Coenzyme Q10, pt no longer taking  Fluticasone, pt no longer using  Folic Acid, pt no longer taking  Lidocaine Patch, pt no longer using   Trimethoprim, therapy completed  Medications added:   None  Medication doses adjusted:   Esomeprazole - adjusted from 40 mg to 20 mg  Rivaroxaban - adjusted from 10 mg daily PRN to 20 mg daily   Other notes:   Montelukast - pt states she does not adhere well, so fill history is not recent    Current Outpatient Medications   Medication Instructions    Albuterol-Budesonide (AIRSUPRA) 90-80 MCG/ACT AERO 2 puffs, PRN    esomeprazole (NEXIUM) 20 mg, DAILY BEFORE BREAKFAST    loratadine (CLARITIN) 10 mg, DAILY    meclizine (ANTIVERT) 12.5 mg, 3 TIMES DAILY PRN    montelukast (SINGULAIR) 10 MG tablet 1 tablet, NIGHTLY    rivaroxaban (XARELTO) 20 mg, DAILY    sertraline (ZOLOFT) 50 mg, DAILY       Yin Coulter  PharmD Candidate 2027

## 2025-05-15 NOTE — H&P
[facility] Medicine History & Physical    V 5.1    Date of Admission: 5/15/2025    Date of Service:  Pt seen/examined on 5/15/25     [x]Admitted to Inpatient with expected LOS greater than two midnights due to medical therapy.  []Placed in Observation status.    Chief Admission Complaint: GI bleed    Presenting Admission History:      80 y.o. female who presented to [facility] with PMHx significant for breast cancer, pulmonary embolism/DVT [on Xarelto] and prior GI bleed [AVMs] who presents at the recommendation of her GI doctor for concern of GI bleed.  Patient reportedly had dark tarry stool and dark vomit on Sunday.  She then had no bowel movements for a few days and once again had dark tarry stool today.  At the recommendation of her GI doctor she presented to the hospital for evaluation.  In the ER she was initiated on a Protonix infusion.  Hemoglobin on presentation reassuring at 13.    Assessment/Plan:        GI bleed  Patient with a history of bleeding AVMs and ulcers.  Currently on Xarelto.  GI consulted  Continue Protonix infusion  Monitor hemoglobin  N.p.o. at midnight to allow for intervention tomorrow if deemed necessary    History of PE/DVT  Has had multiple.  Currently holding Xarelto    Anxiety  Continue Zoloft    Discussed management and the need for Hospitalization of the patient w/ the Emergency Department Provider: Jean Burgos     EKG:  I have reviewed the EKG with the following interpretation: Sinus    Physical Exam Performed:      /87   Pulse 72   Temp 97.5 °F (36.4 °C) (Oral)   Resp 11   Ht 1.575 m (5' 2\")   Wt 88.7 kg (195 lb 9.6 oz)   SpO2 95%   BMI 35.78 kg/m²     General appearance:  No apparent distress, appears stated age and cooperative.  HEENT:  Pupils equal, round, and reactive to light. Conjunctivae/corneas clear.  Respiratory:  Normal respiratory effort. Clear to auscultation bilaterally without Rales/Wheezes/Rhonchi.  Cardiovascular:  Regular

## 2025-05-15 NOTE — ED PROVIDER NOTES
ED Attending Attestation Note     Date of evaluation: 5/15/2025    This patient was seen by the advance practice provider.  I have seen and examined the patient, agree with the workup, evaluation, management and diagnosis. The care plan has been discussed.  My assessment reveals patient is awake alert, nontoxic appearance.  Abdomen soft without rebound guarding distention or focal tenderness.  She gives a concerning history that has high risk features, including therapeutic anticoagulation.  Her evaluation in the Emergency Department is reassuring, but she will benefit from admission for further observation.     Mikel Luis MD  05/15/25 [id number]

## 2025-05-16 ENCOUNTER — APPOINTMENT (OUTPATIENT)
Dept: ENDOSCOPY | Age: 80
DRG: 392 | End: 2025-05-16
Attending: INTERNAL MEDICINE
Payer: MEDICARE

## 2025-05-16 ENCOUNTER — ANESTHESIA (OUTPATIENT)
Dept: ENDOSCOPY | Age: 80
End: 2025-05-16
Payer: MEDICARE

## 2025-05-16 ENCOUNTER — ANESTHESIA EVENT (OUTPATIENT)
Dept: ENDOSCOPY | Age: 80
End: 2025-05-16
Payer: MEDICARE

## 2025-05-16 VITALS
WEIGHT: 193.2 LBS | RESPIRATION RATE: 18 BRPM | DIASTOLIC BLOOD PRESSURE: 73 MMHG | HEIGHT: 62 IN | HEART RATE: 67 BPM | BODY MASS INDEX: 35.55 KG/M2 | SYSTOLIC BLOOD PRESSURE: 120 MMHG | OXYGEN SATURATION: 98 % | TEMPERATURE: 98.1 F

## 2025-05-16 LAB
ANION GAP SERPL CALCULATED.3IONS-SCNC: 10 MMOL/L (ref 3–16)
APTT BLD: 28.7 SEC (ref 22.1–36.4)
BUN SERPL-MCNC: 16 MG/DL (ref 7–20)
CALCIUM SERPL-MCNC: 8.6 MG/DL (ref 8.3–10.6)
CHLORIDE SERPL-SCNC: 101 MMOL/L (ref 99–110)
CO2 SERPL-SCNC: 23 MMOL/L (ref 21–32)
CREAT SERPL-MCNC: 0.7 MG/DL (ref 0.6–1.2)
GFR SERPLBLD CREATININE-BSD FMLA CKD-EPI: 88 ML/MIN/{1.73_M2}
GLUCOSE SERPL-MCNC: 89 MG/DL (ref 70–99)
HCT VFR BLD AUTO: 36.9 % (ref 36–48)
HGB BLD-MCNC: 12.2 G/DL (ref 12–16)
INR PPP: 1.19 (ref 0.85–1.15)
IRON SATN MFR SERPL: 22 % (ref 15–50)
IRON SERPL-MCNC: 66 UG/DL (ref 37–145)
POTASSIUM SERPL-SCNC: 4 MMOL/L (ref 3.5–5.1)
PROTHROMBIN TIME: 15.3 SEC (ref 11.9–14.9)
SODIUM SERPL-SCNC: 134 MMOL/L (ref 136–145)
TIBC SERPL-MCNC: 300 UG/DL (ref 260–445)

## 2025-05-16 PROCEDURE — 3609017100 HC EGD: Performed by: INTERNAL MEDICINE

## 2025-05-16 PROCEDURE — 3700000001 HC ADD 15 MINUTES (ANESTHESIA): Performed by: INTERNAL MEDICINE

## 2025-05-16 PROCEDURE — 80048 BASIC METABOLIC PNL TOTAL CA: CPT

## 2025-05-16 PROCEDURE — 36415 COLL VENOUS BLD VENIPUNCTURE: CPT

## 2025-05-16 PROCEDURE — 96376 TX/PRO/DX INJ SAME DRUG ADON: CPT

## 2025-05-16 PROCEDURE — 2580000003 HC RX 258: Performed by: NURSE ANESTHETIST, CERTIFIED REGISTERED

## 2025-05-16 PROCEDURE — 3700000000 HC ANESTHESIA ATTENDED CARE: Performed by: INTERNAL MEDICINE

## 2025-05-16 PROCEDURE — 83540 ASSAY OF IRON: CPT

## 2025-05-16 PROCEDURE — 7100000011 HC PHASE II RECOVERY - ADDTL 15 MIN: Performed by: INTERNAL MEDICINE

## 2025-05-16 PROCEDURE — 85610 PROTHROMBIN TIME: CPT

## 2025-05-16 PROCEDURE — 85018 HEMOGLOBIN: CPT

## 2025-05-16 PROCEDURE — 2580000003 HC RX 258: Performed by: STUDENT IN AN ORGANIZED HEALTH CARE EDUCATION/TRAINING PROGRAM

## 2025-05-16 PROCEDURE — 6360000002 HC RX W HCPCS: Performed by: NURSE ANESTHETIST, CERTIFIED REGISTERED

## 2025-05-16 PROCEDURE — 6370000000 HC RX 637 (ALT 250 FOR IP): Performed by: STUDENT IN AN ORGANIZED HEALTH CARE EDUCATION/TRAINING PROGRAM

## 2025-05-16 PROCEDURE — 0DJ08ZZ INSPECTION OF UPPER INTESTINAL TRACT, VIA NATURAL OR ARTIFICIAL OPENING ENDOSCOPIC: ICD-10-PCS | Performed by: INTERNAL MEDICINE

## 2025-05-16 PROCEDURE — 6360000002 HC RX W HCPCS: Performed by: STUDENT IN AN ORGANIZED HEALTH CARE EDUCATION/TRAINING PROGRAM

## 2025-05-16 PROCEDURE — 2580000003 HC RX 258: Performed by: INTERNAL MEDICINE

## 2025-05-16 PROCEDURE — G0378 HOSPITAL OBSERVATION PER HR: HCPCS

## 2025-05-16 PROCEDURE — 7100000010 HC PHASE II RECOVERY - FIRST 15 MIN: Performed by: INTERNAL MEDICINE

## 2025-05-16 PROCEDURE — 2709999900 HC NON-CHARGEABLE SUPPLY: Performed by: INTERNAL MEDICINE

## 2025-05-16 PROCEDURE — 85730 THROMBOPLASTIN TIME PARTIAL: CPT

## 2025-05-16 PROCEDURE — 85014 HEMATOCRIT: CPT

## 2025-05-16 PROCEDURE — 83550 IRON BINDING TEST: CPT

## 2025-05-16 RX ORDER — SODIUM CHLORIDE, SODIUM LACTATE, POTASSIUM CHLORIDE, CALCIUM CHLORIDE 600; 310; 30; 20 MG/100ML; MG/100ML; MG/100ML; MG/100ML
INJECTION, SOLUTION INTRAVENOUS ONCE
Status: COMPLETED | OUTPATIENT
Start: 2025-05-16 | End: 2025-05-16

## 2025-05-16 RX ORDER — LIDOCAINE HYDROCHLORIDE 20 MG/ML
INJECTION, SOLUTION EPIDURAL; INFILTRATION; INTRACAUDAL; PERINEURAL
Status: DISCONTINUED | OUTPATIENT
Start: 2025-05-16 | End: 2025-05-16 | Stop reason: SDUPTHER

## 2025-05-16 RX ORDER — SODIUM CHLORIDE, SODIUM LACTATE, POTASSIUM CHLORIDE, CALCIUM CHLORIDE 600; 310; 30; 20 MG/100ML; MG/100ML; MG/100ML; MG/100ML
INJECTION, SOLUTION INTRAVENOUS
Status: DISCONTINUED | OUTPATIENT
Start: 2025-05-16 | End: 2025-05-16 | Stop reason: SDUPTHER

## 2025-05-16 RX ORDER — PROPOFOL 10 MG/ML
INJECTION, EMULSION INTRAVENOUS
Status: DISCONTINUED | OUTPATIENT
Start: 2025-05-16 | End: 2025-05-16 | Stop reason: SDUPTHER

## 2025-05-16 RX ADMIN — LIDOCAINE HYDROCHLORIDE 50 MG: 20 INJECTION, SOLUTION EPIDURAL; INFILTRATION; INTRACAUDAL; PERINEURAL at 13:16

## 2025-05-16 RX ADMIN — PANTOPRAZOLE SODIUM 8 MG/HR: 40 INJECTION, POWDER, FOR SOLUTION INTRAVENOUS at 02:06

## 2025-05-16 RX ADMIN — SERTRALINE 50 MG: 50 TABLET, FILM COATED ORAL at 09:49

## 2025-05-16 RX ADMIN — SODIUM CHLORIDE, SODIUM LACTATE, POTASSIUM CHLORIDE, AND CALCIUM CHLORIDE: .6; .31; .03; .02 INJECTION, SOLUTION INTRAVENOUS at 13:09

## 2025-05-16 RX ADMIN — SODIUM CHLORIDE, SODIUM LACTATE, POTASSIUM CHLORIDE, AND CALCIUM CHLORIDE: .6; .31; .03; .02 INJECTION, SOLUTION INTRAVENOUS at 13:12

## 2025-05-16 RX ADMIN — PROPOFOL 50 MG: 10 INJECTION, EMULSION INTRAVENOUS at 13:16

## 2025-05-16 RX ADMIN — PROPOFOL 50 MG: 10 INJECTION, EMULSION INTRAVENOUS at 13:20

## 2025-05-16 ASSESSMENT — PAIN - FUNCTIONAL ASSESSMENT: PAIN_FUNCTIONAL_ASSESSMENT: NONE - DENIES PAIN

## 2025-05-16 NOTE — OP NOTE
EGD REPORT    Patient:  Geno Blanchard                  1945    Referring Physician:  Lang    Endoscopist: Gi     Indication:  Dark stool and dark emesis      Medications:  MAC      Pre-Anesthesia Assessment:  I have reviewed and am in agreement with patient history and medication, including previous response to sedation.    Prior to the procedure, a History and Physical was performed, and patient medications and allergies were reviewed. The patient is competent. The risks and benefits of the procedure and the sedation options and risks were discussed with the patient.   Risks discussed included but were not limited to infection, bleeding, perforation, death, and missed lesions.  All questions were answered and informed consent was obtained. Patient identification and proposed procedure were verified by the physician and the nurse in the pre-procedure area in the procedure room.     Mallampatti: II  ASA Grade Assessment: 3     After reviewing the risks and benefits, the patient was deemed in satisfactory condition to undergo the procedure. The anesthesia plan was to use MAC anesthesia. Immediately prior to administration of medications, the patient was re-assessed for adequacy to receive sedatives and a time out was performed. Patient and healthcare providers were in agreement it was the correct patient and procedure. The heart rate, respiratory rate, oxygen saturations, blood pressure, adequacy of pulmonary ventilation, and response to care were monitored throughout the procedure. The physical status of the patient was re-assessed after the procedure.     After obtaining informed consent, the endoscope was passed under direct vision.   The endoscope was introduced through the mouth, and advanced to the second part of duodenum. The EGD was accomplished without difficulty. The patient tolerated the procedure well.       Duodenum: Normal - yellow bile and no visible AVMs  Stomach: large hiatal hernia  without Koko's erosions or ulcerations, nonspecific gastric erythema but no visible AVMs  Esophagus:  No evidence of Stevens's or esophagitis.    Estimated blood loss none    Plan:  No fresh or old blood present  Large hiatal hernia is really the only notable finding  Hb is stable  Would advance diet and resume normal oral PPI  She can be discharged from my standpoint - follow up with Dr Uribe  Will sign off

## 2025-05-16 NOTE — ANESTHESIA PRE PROCEDURE
Department of Anesthesiology  Preprocedure Note       Name:  Geno Blanchard   Age:  80 y.o.  :  1945                                          MRN:  [id number]         Date:  2025      Surgeon: Surgeon(s):  Angela Angelo MD    Procedure: Procedure(s):  ESOPHAGOGASTRODUODENOSCOPY    Medications prior to admission:   Prior to Admission medications    Medication Sig Start Date End Date Taking? Authorizing Provider   esomeprazole (NEXIUM) 20 MG delayed release capsule Take 1 capsule by mouth every morning (before breakfast)   Yes ProviderJuan Diego MD   rivaroxaban (XARELTO) 20 MG TABS tablet Take 1 tablet by mouth daily   Yes ProviderJuan Diego MD   montelukast (SINGULAIR) 10 MG tablet Take 1 tablet by mouth nightly 24  Yes ProviderJuan Diego MD   Albuterol-Budesonide (AIRSUPRA) 90-80 MCG/ACT AERO Inhale 2 puffs into the lungs as needed 10/22/24  Yes ProviderJuan Diego MD   meclizine (ANTIVERT) 12.5 MG tablet Take 1 tablet by mouth 3 times daily as needed   Yes Provider, MD Juan Diego   sertraline (ZOLOFT) 50 MG tablet Take 1 tablet by mouth daily   Yes Provider, MD Juan Diego   loratadine (CLARITIN) 10 MG tablet Take 1 tablet by mouth daily   Yes Provider, MD Juan Diego       Current medications:    Current Facility-Administered Medications   Medication Dose Route Frequency Provider Last Rate Last Admin   • pantoprazole (PROTONIX) 80 mg in sodium chloride 0.9 % 100 mL infusion  8 mg/hr IntraVENous Continuous Thomas Mukherjee MD 10 mL/hr at 25 0206 8 mg/hr at 25 0206   • albuterol (PROVENTIL) (2.5 MG/3ML) 0.083% nebulizer solution 2.5 mg  2.5 mg Nebulization Q4H PRN Thomas Mukherjee MD       • montelukast (SINGULAIR) tablet 10 mg  10 mg Oral Nightly Thomas Mukherjee MD   10 mg at 05/15/25 2046   • sertraline (ZOLOFT) tablet 50 mg  50 mg Oral Daily Thomas Mukherjee MD   50 mg at 25 0949   • sodium chloride flush 0.9 % injection 5-40 mL

## 2025-05-16 NOTE — PROGRESS NOTES
Discharge instructions reviewed with pt and spouse at the bedside. All questions and concerns addressed. IV removed per protocol, no complications followed. Pt belongings gathered and pt taken downstairs via wheelchair and left with spouse driving.

## 2025-05-16 NOTE — ANESTHESIA POSTPROCEDURE EVALUATION
Department of Anesthesiology  Postprocedure Note    Patient: Geno Blanchard  MRN: [id number]  YOB: 1945  Date of evaluation: 5/16/2025    Procedure Summary       Date: 05/16/25 Room / Location: [facility] ENDO 01 / [facility]    Anesthesia Start: 1312 Anesthesia Stop: 1329    Procedure: ESOPHAGOGASTRODUODENOSCOPY (Upper GI Region) Diagnosis:       Melena      Anemia, unspecified type      (Melena [K92.1])      (Anemia, unspecified type [D64.9])    Surgeons: Angela Angelo MD Responsible Provider: Randall Frazier MD    Anesthesia Type: general ASA Status: 3            Anesthesia Type: No value filed.    Omero Phase I: Omero Score: 10    Omero Phase II: Omero Score: 10    Anesthesia Post Evaluation    Patient location during evaluation: PACU  Patient participation: complete - patient participated  Level of consciousness: awake and alert  Pain score: 0  Airway patency: patent  Nausea & Vomiting: no nausea and no vomiting  Cardiovascular status: hemodynamically stable  Respiratory status: acceptable  Hydration status: euvolemic  Pain management: adequate    No notable events documented.

## 2025-05-16 NOTE — DISCHARGE SUMMARY
V2.0  Discharge Summary    Name:  Geno Blanchard /Age/Sex: 1945 (80 y.o. female)   Admit Date: 5/15/2025  Discharge Date: 25    MRN & CSN:  [id number] & [id number] Encounter Date and Time 25 4:00 PM EDT    Attending:  Bret Croft MD Discharging Provider: PANCHITO Mcintyre - [facility]       Hospital Course:     Brief HPI: Geno Blanchard is a 80 y.o. female who presented with with PMHx significant for breast cancer, CVA, DVT/PE [on Xarelto] and prior GI bleed [AVMs] who presents at the recommendation of her GI doctor for concern of GI bleed.  Patient reportedly had dark tarry stool and dark vomit on .  She then had no bowel movements for a few days and once again had dark tarry stool today.  At the recommendation of her GI doctor she presented to the hospital for evaluation.  In the ER she was initiated on a Protonix infusion.  Hemoglobin on presentation reassuring at 13. GI consulted and completed EGD without significant findings; recommended to follow up with Dr. Uribe in the near future and continue PPI.      Problems addressed during this hospitalization:     GI Bleed, upper (ruled out)   Hgb stable; pt admits recent history of Iron infusions   Afebrile, Labs WNL, UA NEG   CTA ABD  No visualized acute GI hemorrhage.   Large hiatal hernia.   Bilateral ovarian cysts   GI consulted    EGD completed     No fresh or old blood present  Large hiatal hernia is really the only notable finding  Hb is stable  Advance diet and resume normal oral PPI  Can be discharged - follow up with Dr Uribe  Continue PPI  Prior CVA, DVT/PE   Continue Xarelto  Anxiety   Continue Zoloft    This patient was seen and examined/discussed in conjunction with Dr. Croft. He was agreeable with patient's plan at discharge as dictated above.     On the basis of discharge, the patient was found to not be in any acute distress, with vital signs within normal limits, and no abnormalities on physical examination. Further, the  ALARA standards for radiation dose reduction. In addition to gender specific dose reduction algorithms, the dose reduction techniques vary based on the specific scanner utilized but frequently include automated exposure control, adjustment of the mA and/or kV according to patient size, and use of iterative reconstruction technique. COMMENTS: Mild degenerative changes in the spine. Old left rib fractures. The lung bases are clear. Small bilateral fatty inguinal hernias are incidentally noted. Large hiatal hernia. Colonic diverticulosis. Gallbladder is unremarkable. Solid abdominal organs are unremarkable. Small amount of air is seen in the bladder; correlate for recent catheterization. Otherwise infection and fistula are differential considerations. The uterus is surgically absent. 2.5 cm right and 3.1 cm left ovarian cysts can be further evaluated with nonemergent outpatient ultrasound. No lymphadenopathy. No evidence of acute gastrointestinal hemorrhage is visualized.     No visualized acute GI hemorrhage. Large hiatal hernia. Bilateral ovarian cysts require further evaluation as detailed above. Electronically signed by Randall Hill      CBC:   Recent Labs     05/15/25  1515 05/15/25  2315 05/16/25  0359   WBC 5.2  --   --    HGB 13.0 12.6 12.2     --   --      BMP:    Recent Labs     05/15/25  1515 05/16/25  0359    134*   K 4.2 4.0    101   CO2 25 23   BUN 14 16   CREATININE 0.7 0.7   GLUCOSE 103* 89     Hepatic:   Recent Labs     05/15/25  1515   AST 22   ALT 15   BILITOT 0.3   ALKPHOS 58     Lipids:   Lab Results   Component Value Date/Time    CHOL 245 12/22/2011 10:20 AM    HDL 59 12/22/2011 10:20 AM    TRIG 94 12/22/2011 10:20 AM     Hemoglobin A1C: No results found for: \"LABA1C\"  TSH:   Lab Results   Component Value Date/Time    TSH 1.09 06/06/2011 09:30 AM     Troponin: No results found for: \"TROPONINT\"  Lactic Acid: No results for input(s): \"LACTA\" in the last 72 hours.  BNP: No  results for input(s): \"PROBNP\" in the last 72 hours.  UA:  Lab Results   Component Value Date/Time    NITRU Negative 05/15/2025 04:30 PM    COLORU Yellow 05/15/2025 04:30 PM    PHUR 6.0 05/15/2025 04:30 PM    PHUR 6.0 05/03/2021 01:13 PM    WBCUA 3-5 05/03/2021 01:13 PM    RBCUA None seen 05/03/2021 01:13 PM    CLARITYU Clear 05/15/2025 04:30 PM    LEUKOCYTESUR Negative 05/15/2025 04:30 PM    UROBILINOGEN 0.2 05/15/2025 04:30 PM    BILIRUBINUR Negative 05/15/2025 04:30 PM    BLOODU Negative 05/15/2025 04:30 PM    GLUCOSEU Negative 05/15/2025 04:30 PM    KETUA Negative 05/15/2025 04:30 PM     Urine Cultures: No results found for: \"LABURIN\"  Blood Cultures: No results found for: \"BC\"  No results found for: \"BLOODCULT2\"  Organism: No results found for: \"ORG\"    Time Spent Discharging patient 20 minutes    Electronically signed by PANCHITO Mcintyre CNP on 5/16/2025 at 1600 PM

## 2025-05-16 NOTE — PROGRESS NOTES
Hospital Medicine Progress Note      Date of Admission: 5/15/2025  Hospital Day: 2    Chief Admission Complaint:  GI bleeding     Subjective:  Patient is without complaints. No adverse interval events.      Presenting Admission History:       80 y.o. female who presented to [facility] with PMHx significant for breast cancer, pulmonary embolism/DVT [on Xarelto] and prior GI bleed [AVMs] who presents at the recommendation of her GI doctor for concern of GI bleed.  Patient reportedly had dark tarry stool and dark vomit on Sunday.  She then had no bowel movements for a few days and once again had dark tarry stool today.  At the recommendation of her GI doctor she presented to the hospital for evaluation.  In the ER she was initiated on a Protonix infusion.  Hemoglobin on presentation reassuring at 13.     Assessment/Plan:      Current Principal Problem:  GI bleed  GI bleed  Patient with a history of bleeding AVMs and ulcers.  Currently on Xarelto.  GI consulted  Continue Protonix infusion  Monitor hemoglobin  N.p.o. at midnight to allow for intervention tomorrow if deemed necessary     History of PE/DVT  Has had multiple.  Currently holding Xarelto     Anxiety  Continue Zoloft      Physical Exam Performed:      General: NAD  Eyes: EOMI  ENT: neck supple  Cardiovascular: Regular rate.  Respiratory: Clear to auscultation  Gastrointestinal: Soft, non tender  Genitourinary: no suprapubic tenderness  Musculoskeletal: No edema  Skin: warm, dry  Neuro: Alert.  Psych: Mood appropriate.     BP (!) 146/81   Pulse 63   Temp 98.1 °F (36.7 °C) (Oral)   Resp 16   Ht 1.575 m (5' 2.01\")   Wt 87.6 kg (193 lb 3.2 oz)   SpO2 97%   BMI 35.33 kg/m²     Diet: Diet NPO Exceptions are: Ice Chips, Sips of Water with Meds, Sips of Clear Liquids  DVT Prophylaxis: []PPx LMWH  []SQ Heparin  []IPC/SCDs  []Eliquis  []Xarelto  []Coumadin  []Other -      Code status: Full Code  PT/OT Eval Status:   []NOT yet ordered  []Ordered

## 2025-05-16 NOTE — DISCHARGE INSTR - COC
Continuity of Care Form    Patient Name: Geno Blanchard   :  1945  MRN:  [id number]    Admit date:  5/15/2025  Discharge date:  ***    Code Status Order: Full Code   Advance Directives:     Admitting Physician:  Bret Croft MD  PCP: Shreya Lucio MD    Discharging Nurse: ***  Discharging Hospital Unit/Room#: Endo Pool/NONE  Discharging Unit Phone Number: ***    Emergency Contact:   Extended Emergency Contact Information  Primary Emergency Contact: Bravo Blanchard  Address: [address] [place] [address]  Home Phone: [phone]  Work Phone: [phone]  Mobile Phone: [phone]  Relation: Spouse    Past Surgical History:  Past Surgical History:   Procedure Laterality Date    BREAST BIOPSY      BREAST LUMPECTOMY Right     multiple    EYE SURGERY      RK and cataract removal with lens    HYSTERECTOMY (CERVIX STATUS UNKNOWN)      JOINT REPLACEMENT Left     left knee replacement by Dr. Michelle    TONSILLECTOMY      UPPER GASTROINTESTINAL ENDOSCOPY N/A 2025    ENTEROSCOPY PUSH CONTROL HEMORRHAGE performed by Rony Munoz MD at [facility] ENDOSCOPY    UPPER GASTROINTESTINAL ENDOSCOPY N/A 2025    ESOPHAGOGASTRODUODENOSCOPY performed by Angela Angelo MD at [facility] ENDOSCOPY       Immunization History:   Immunization History   Administered Date(s) Administered    COVID-19, MODERNA Bivalent, (age 12y+), IM, 50 mcg/0.5 mL 2023    COVID-19, PFIZER, , (age 12y+), IM, 30mcg/0.3mL 10/05/2023, 2024       Active Problems:  Patient Active Problem List   Diagnosis Code    Knee effusion M25.469    Primary osteoarthritis of right knee M17.11    Acquired valgus deformity knee M21.069    Status post total left knee replacement Z96.652    Unstable right knee M25.361    Chronic pain of right knee M25.561, G89.29    Sprain of medial collateral ligament of right knee S83.411A    Internal derangement of right knee M23.91    GI bleed K92.2

## 2025-05-16 NOTE — PROGRESS NOTES
4 Eyes Skin Assessment     NAME:  Geno Blanchard  YOB: 1945  MEDICAL RECORD NUMBER:  [id number]    The patient is being assessed for  Admission    I agree that at least one RN has performed a thorough Head to Toe Skin Assessment on the patient. ALL assessment sites listed below have been assessed.      Areas assessed by both nurses:    Head, Face, Ears, Shoulders, Back, Chest, Arms, Elbows, Hands, Sacrum. Buttock, Coccyx, Ischium, Legs. Feet and Heels, and Under Medical Devices         Does the Patient have a Wound? No noted wound(s)       Morales Prevention initiated by RN: Yes  Wound Care Orders initiated by RN: No    Pressure Injury (Stage 3,4, Unstageable, DTI, NWPT, and Complex wounds) if present, place Wound referral order by RN under : No    New Ostomies, if present place, Ostomy referral order under : No     Nurse 1 eSignature: Electronically signed by Suzy Lyn RN on 5/15/25 at 8:41 PM EDT    **SHARE this note so that the co-signing nurse can place an eSignature**    Nurse 2 eSignature: {Esignature:[id number]}

## 2025-05-16 NOTE — H&P
Pre-operative History and Physical    Patient: Geno Blanchard  : 1945     History Obtained From:  patient, electronic medical record    HISTORY OF PRESENT ILLNESS:    The patient is a 80 y.o. female who presents for an EGD for possible GIB. She had dark tarry stool and dark emesis about 5d ago.     Past Medical History:        Diagnosis Date    Arthritis     Asthma     Breast cancer (HCC)     right breast    Cerebral artery occlusion with cerebral infarction (HCC) 2018    Fibromyalgia     Hx of blood clots     PE nad DVT    Hyperlipidemia     Obesity     Vertigo      Past Surgical History:        Procedure Laterality Date    BREAST BIOPSY      BREAST LUMPECTOMY Right     multiple    EYE SURGERY      RK and cataract removal with lens    HYSTERECTOMY (CERVIX STATUS UNKNOWN)      JOINT REPLACEMENT Left     left knee replacement by Dr. Michelle    TONSILLECTOMY      UPPER GASTROINTESTINAL ENDOSCOPY N/A 2025    ENTEROSCOPY PUSH CONTROL HEMORRHAGE performed by Rony Munoz MD at [facility] ENDOSCOPY     Medications Prior to Admission:   No current facility-administered medications on file prior to encounter.     Current Outpatient Medications on File Prior to Encounter   Medication Sig Dispense Refill    esomeprazole (NEXIUM) 20 MG delayed release capsule Take 1 capsule by mouth every morning (before breakfast)      rivaroxaban (XARELTO) 20 MG TABS tablet Take 1 tablet by mouth daily      montelukast (SINGULAIR) 10 MG tablet Take 1 tablet by mouth nightly      Albuterol-Budesonide (AIRSUPRA) 90-80 MCG/ACT AERO Inhale 2 puffs into the lungs as needed      meclizine (ANTIVERT) 12.5 MG tablet Take 1 tablet by mouth 3 times daily as needed      sertraline (ZOLOFT) 50 MG tablet Take 1 tablet by mouth daily      loratadine (CLARITIN) 10 MG tablet Take 1 tablet by mouth daily       Allergies:  Sulfa antibiotics, Celecoxib, and Ciprofloxacin    History of allergic reaction to anesthesia:  No    Family

## 2025-05-16 NOTE — PLAN OF CARE
Problem: Chronic Conditions and Co-morbidities  Goal: Patient's chronic conditions and co-morbidity symptoms are monitored and maintained or improved  5/16/2025 1536 by Celeste Darling RN  Outcome: Completed  Flowsheets (Taken 5/16/2025 1536)  Care Plan - Patient's Chronic Conditions and Co-Morbidity Symptoms are Monitored and Maintained or Improved:   Monitor and assess patient's chronic conditions and comorbid symptoms for stability, deterioration, or improvement   Collaborate with multidisciplinary team to address chronic and comorbid conditions and prevent exacerbation or deterioration   Update acute care plan with appropriate goals if chronic or comorbid symptoms are exacerbated and prevent overall improvement and discharge  5/16/2025 0653 by Suzy Lyn, RN  Outcome: Progressing  Flowsheets (Taken 5/15/2025 1951)  Care Plan - Patient's Chronic Conditions and Co-Morbidity Symptoms are Monitored and Maintained or Improved: Collaborate with multidisciplinary team to address chronic and comorbid conditions and prevent exacerbation or deterioration     Problem: Discharge Planning  Goal: Discharge to home or other facility with appropriate resources  5/16/2025 1536 by Celeste Darling RN  Outcome: Completed  Flowsheets (Taken 5/16/2025 1536)  Discharge to home or other facility with appropriate resources: Identify barriers to discharge with patient and caregiver  5/16/2025 0653 by Suzy Lyn, RN  Outcome: Progressing  Flowsheets  Taken 5/15/2025 2020  Discharge to home or other facility with appropriate resources: Identify barriers to discharge with patient and caregiver  Taken 5/15/2025 1951  Discharge to home or other facility with appropriate resources: Identify barriers to discharge with patient and caregiver     Problem: Safety - Adult  Goal: Free from fall injury  5/16/2025 1536 by Celeste Darling, RN  Outcome: Completed  Flowsheets (Taken 5/16/2025 1536)  Free From Fall Injury: Instruct

## 2025-05-19 LAB
BLOOD BANK DISPENSE STATUS: NORMAL
BLOOD BANK PRODUCT CODE: NORMAL
BPU ID: NORMAL
DESCRIPTION BLOOD BANK: NORMAL

## (undated) DEVICE — CANNULA SAMP CO2 AD GRN 7FT CO2 AND 7FT O2 TBNG UNIV CONN

## (undated) DEVICE — ERBE NESSY®PLATE 170 SPLIT; 168CM²; CABLE 3M: Brand: ERBE

## (undated) DEVICE — FIAPC® PROBE W/ FILTER 3000 A OD 2.3MM/6.9FR; L 3M/9.8FT: Brand: ERBE